# Patient Record
Sex: FEMALE | Race: WHITE | NOT HISPANIC OR LATINO | Employment: OTHER | URBAN - METROPOLITAN AREA
[De-identification: names, ages, dates, MRNs, and addresses within clinical notes are randomized per-mention and may not be internally consistent; named-entity substitution may affect disease eponyms.]

---

## 2017-05-04 ENCOUNTER — APPOINTMENT (OUTPATIENT)
Dept: WOMENS IMAGING | Facility: HOSPITAL | Age: 62
End: 2017-05-04

## 2017-05-04 PROCEDURE — 77067 SCR MAMMO BI INCL CAD: CPT | Performed by: RADIOLOGY

## 2017-06-16 ENCOUNTER — OFFICE VISIT (OUTPATIENT)
Dept: CARDIOLOGY | Facility: CLINIC | Age: 62
End: 2017-06-16

## 2017-06-16 VITALS
DIASTOLIC BLOOD PRESSURE: 62 MMHG | SYSTOLIC BLOOD PRESSURE: 90 MMHG | BODY MASS INDEX: 18.49 KG/M2 | WEIGHT: 122 LBS | HEART RATE: 65 BPM | HEIGHT: 68 IN

## 2017-06-16 DIAGNOSIS — I47.1 SVT (SUPRAVENTRICULAR TACHYCARDIA) (HCC): ICD-10-CM

## 2017-06-16 DIAGNOSIS — R55 NEAR SYNCOPE: Primary | ICD-10-CM

## 2017-06-16 DIAGNOSIS — I34.1 MVP (MITRAL VALVE PROLAPSE): ICD-10-CM

## 2017-06-16 DIAGNOSIS — R07.2 PRECORDIAL PAIN: ICD-10-CM

## 2017-06-16 PROCEDURE — 93000 ELECTROCARDIOGRAM COMPLETE: CPT | Performed by: INTERNAL MEDICINE

## 2017-06-16 PROCEDURE — 99213 OFFICE O/P EST LOW 20 MIN: CPT | Performed by: INTERNAL MEDICINE

## 2017-06-16 RX ORDER — GABAPENTIN 300 MG/1
300 CAPSULE ORAL 2 TIMES DAILY
COMMUNITY
End: 2017-12-18 | Stop reason: ALTCHOICE

## 2017-06-16 RX ORDER — TEMAZEPAM 30 MG/1
30 CAPSULE ORAL NIGHTLY PRN
COMMUNITY

## 2017-06-16 NOTE — PROGRESS NOTES
Date of Office Visit: 2017  Encounter Provider: Petra Tinoco MD  Place of Service: [facility] CARDIOLOGY  Patient Name: Ed Betancur  :1955    Chief complaint  Followup of mitral valve prolapse and supraventricular tachycardia    History of Present Illness  The patient is a 62 year old female with history of intermittent chest pain that's been predominantly atypical and nonspecific ST-T wave changes were seen in 2016 after an episode of near syncope in the setting of GI distress and a history of 30 pound weight loss.  She had a 17 day monitor that revealed sinus rhythm with no arrhythmia despite symptoms of dizziness.  She had a stress echocardiogram that revealed no ischemia at an excellent workload with 2 episodes of brief supraventricular tachycardia noted in early recovery resting images showed normal systolic function with negative saline injection.  There was mitral valve prolapse with mild to moderate mitral regurgitation noted.  There was mild to moderate tricuspid regurgitation with normal right-sided pressures.  These are protective maneuvers were discussed as well as liberalization of salt and she is here for follow-up    In the interim she states that her dizzy spells have improved she is walking 5-6 times a day.  Blood pressure remains low ago she's tried to liberalize salt slightly.  She has sharp stabbing fleeting chest pain that occurs every 2-3 weeks.  It is decreased in frequency.  Of note she denies any apneic episodes snoring family history of sleep apnea or daytime somnolence.    Past Medical History:   Diagnosis Date   • Acute bronchitis    • Chest pain    • Epigastric pain    • Fatigue    • Headache    • Health care maintenance    • Insomnia    • Muscle spasm    • Pain, upper back    • Postmenopausal      Past Surgical History:   Procedure Laterality Date   • BREAST SURGERY     •  SECTION     • HX OVARIAN CYSTECTOMY     • HYSTEROSCOPY  ENDOMETRIAL ABLATION     • OVARIAN CYST REMOVAL     • TONSILLECTOMY       Outpatient Medications Prior to Visit   Medication Sig Dispense Refill   • acetaminophen-codeine (TYLENOL #3) 300-30 MG per tablet Take by mouth.     • atorvastatin (LIPITOR) 10 MG tablet Take 10 mg by mouth Daily.     • DULoxetine (CYMBALTA) 30 MG capsule Take 30 mg by mouth Daily.     • estradiol (ESTRACE) 0.1 MG/GM vaginal cream Insert 2 g into the vagina Daily.     • Magnesium Oxide (MAG-200 PO) Take 300 mg by mouth Daily.     • Multiple Vitamins-Minerals (MULTIVITAMIN ADULT PO) Take  by mouth.     • Nutritional Supplements (JUICE PLUS FIBRE PO) Take  by mouth.     • Probiotic Product (PROBIOTIC DAILY PO) Take  by mouth.     • eszopiclone (LUNESTA) 3 MG tablet Take 3 mg by mouth Every Night. Take immediately before bedtime     • LORazepam (ATIVAN) 1 MG tablet Take 1 mg by mouth Every 8 (Eight) Hours As Needed for anxiety.     • OMEGA FATTY ACIDS-VITAMINS PO Take  by mouth.     • sucralfate (CARAFATE) 1 G tablet Take 1 g by mouth 2 (Two) Times a Day.     • zolpidem (AMBIEN) 10 MG tablet Take 10 mg by mouth At Night As Needed for sleep. At bedtime       No facility-administered medications prior to visit.      Allergies as of 06/16/2017 - [facility] as Reviewed 06/16/2017   Allergen Reaction Noted   • Sulfa antibiotics Hives 06/25/2012   • Latex Rash 11/10/2016     Social History     Social History   • Marital status:      Spouse name: N/A   • Number of children: N/A   • Years of education: N/A     Occupational History   • Not on file.     Social History Main Topics   • Smoking status: Former Smoker   • Smokeless tobacco: Not on file   • Alcohol use Yes      Comment: moderate alcohol use   • Drug use: Not on file   • Sexual activity: Not on file     Other Topics Concern   • Not on file     Social History Narrative     Family History   Problem Relation Age of Onset   • Alzheimer's disease Mother    • Breast cancer Mother    • Heart disease  "Father    • Colon cancer Maternal Grandmother    • Breast cancer Paternal Grandmother    • Alzheimer's disease Paternal Grandfather      Review of Systems   Constitution: Negative for fever, malaise/fatigue, weight gain and weight loss.   HENT: Negative for ear pain, hearing loss, nosebleeds and sore throat.    Eyes: Negative for double vision, pain, vision loss in left eye and vision loss in right eye.   Cardiovascular:        See history of present illness.   Respiratory: Positive for shortness of breath. Negative for cough, sleep disturbances due to breathing, snoring and wheezing.    Endocrine: Negative for cold intolerance, heat intolerance and polyuria.   Skin: Negative for itching, poor wound healing and rash.   Musculoskeletal: Positive for myalgias. Negative for joint pain and joint swelling.   Gastrointestinal: Negative for abdominal pain, diarrhea, hematochezia, nausea and vomiting.   Genitourinary: Negative for hematuria and hesitancy.   Neurological: Positive for paresthesias. Negative for numbness and seizures.   Psychiatric/Behavioral: Negative for depression. The patient is not nervous/anxious.      Objective:     Vitals:    06/16/17 1009   BP: 90/62   Pulse: 65   Weight: 122 lb (55.3 kg)   Height: 68\" (172.7 cm)     Body mass index is 18.55 kg/(m^2).    Physical Exam   Constitutional: She is oriented to person, place, and time. She appears well-developed and well-nourished.   HENT:   Head: Normocephalic.   Nose: Nose normal.   Mouth/Throat: Oropharynx is clear and moist.   Eyes: Conjunctivae and EOM are normal. Pupils are equal, round, and reactive to light. Right eye exhibits no discharge. No scleral icterus.   Neck: Normal range of motion. Neck supple. No JVD present. No thyromegaly present.   Cardiovascular: Normal rate, regular rhythm, normal heart sounds and intact distal pulses.  Exam reveals no gallop and no friction rub.    No murmur heard.  Pulses:       Carotid pulses are 2+ on the right " side, and 2+ on the left side.       Radial pulses are 2+ on the right side, and 2+ on the left side.        Femoral pulses are 2+ on the right side, and 2+ on the left side.       Popliteal pulses are 2+ on the right side, and 2+ on the left side.        Dorsalis pedis pulses are 2+ on the right side, and 2+ on the left side.        Posterior tibial pulses are 2+ on the right side, and 2+ on the left side.   Pulmonary/Chest: Effort normal and breath sounds normal. No respiratory distress. She has no wheezes. She has no rales.   Abdominal: Soft. Bowel sounds are normal. She exhibits no distension. There is no hepatosplenomegaly. There is no tenderness. There is no rebound.   Musculoskeletal: Normal range of motion. She exhibits no edema or tenderness.   Neurological: She is alert and oriented to person, place, and time.   Skin: Skin is warm and dry. No rash noted. No erythema.   Psychiatric: She has a normal mood and affect. Her behavior is normal. Judgment and thought content normal.   Vitals reviewed.    Lab Review:     ECG 12 Lead  Date/Time: 6/16/2017 10:05 PM  Performed by: YULIA STOVER  Authorized by: YULIA STOVER   Comparison: compared with previous ECG   Similar to previous ECG  Rhythm: sinus rhythm  Conduction comments: Nonspecific ST T wave changes  QTc = 439 msec  Clinical impression: abnormal ECG          Assessment:       Diagnosis Plan   1. Near syncope  ECG 12 Lead   2. Precordial pain  ECG 12 Lead   3. SVT (supraventricular tachycardia)  ECG 12 Lead   4. MVP (mitral valve prolapse)  ECG 12 Lead     Plan:       1. Near syncope, Likely vaguely mediated.  I strongly recommended she wear some support stockings and continue to liberalize salt watching her blood pressure carefully for hypertension  2. Paroxysmal supraventricular tachycardia, episodes of quite brief we'll observe for now she is avoiding stimulants  3. MVP with mild to moderate MR. Will check an echo in 6 moths at followup     Ed Betancur    Home Medication Instructions RENEE:    Printed on:06/19/17 2184   Medication Information                      acetaminophen-codeine (TYLENOL #3) 300-30 MG per tablet  Take by mouth.             atorvastatin (LIPITOR) 10 MG tablet  Take 10 mg by mouth Daily.             DULoxetine (CYMBALTA) 30 MG capsule  Take 30 mg by mouth Daily.             estradiol (ESTRACE) 0.1 MG/GM vaginal cream  Insert 2 g into the vagina Daily.             gabapentin (NEURONTIN) 300 MG capsule  Take 300 mg by mouth 2 (Two) Times a Day.             Magnesium Oxide (MAG-200 PO)  Take 300 mg by mouth Daily.             Multiple Vitamins-Minerals (MULTIVITAMIN ADULT PO)  Take  by mouth.             Nutritional Supplements (JUICE PLUS FIBRE PO)  Take  by mouth.             Probiotic Product (PROBIOTIC DAILY PO)  Take  by mouth.             temazepam (RESTORIL) 30 MG capsule  Take 30 mg by mouth At Night As Needed for Sleep.               Dictated utilizing Dragon dictation

## 2017-06-18 PROBLEM — I34.1 MVP (MITRAL VALVE PROLAPSE): Status: ACTIVE | Noted: 2017-06-18

## 2017-06-18 PROBLEM — I47.1 SVT (SUPRAVENTRICULAR TACHYCARDIA) (HCC): Status: ACTIVE | Noted: 2017-06-18

## 2017-06-18 PROBLEM — I47.10 SVT (SUPRAVENTRICULAR TACHYCARDIA): Status: ACTIVE | Noted: 2017-06-18

## 2017-12-18 ENCOUNTER — OFFICE VISIT (OUTPATIENT)
Dept: CARDIOLOGY | Facility: CLINIC | Age: 62
End: 2017-12-18

## 2017-12-18 VITALS
HEIGHT: 68 IN | SYSTOLIC BLOOD PRESSURE: 98 MMHG | WEIGHT: 128.6 LBS | DIASTOLIC BLOOD PRESSURE: 62 MMHG | HEART RATE: 68 BPM | BODY MASS INDEX: 19.49 KG/M2

## 2017-12-18 DIAGNOSIS — I34.0 NON-RHEUMATIC MITRAL REGURGITATION: Primary | ICD-10-CM

## 2017-12-18 DIAGNOSIS — I47.1 SVT (SUPRAVENTRICULAR TACHYCARDIA) (HCC): ICD-10-CM

## 2017-12-18 DIAGNOSIS — I34.1 MVP (MITRAL VALVE PROLAPSE): ICD-10-CM

## 2017-12-18 PROCEDURE — 93000 ELECTROCARDIOGRAM COMPLETE: CPT | Performed by: INTERNAL MEDICINE

## 2017-12-18 PROCEDURE — 99213 OFFICE O/P EST LOW 20 MIN: CPT | Performed by: INTERNAL MEDICINE

## 2017-12-18 RX ORDER — DULOXETIN HYDROCHLORIDE 60 MG/1
60 CAPSULE, DELAYED RELEASE ORAL DAILY
COMMUNITY

## 2017-12-18 NOTE — PROGRESS NOTES
Date of Office Visit: 2017  Encounter Provider: Petra Tinoco MD  Place of Service: [facility] CARDIOLOGY  Patient Name: Ed Betancur  :1955    Chief complaint  Followup of mitral valve prolapse and supraventricular tachycardia    History of Present Illness  The patient is a 62 year old female with history of intermittent chest pain that's been predominantly atypical and nonspecific ST-T wave changes were seen in 2016 after an episode of near syncope in the setting of GI distress and a history of 30 pound weight loss.  She had a 17 day monitor that revealed sinus rhythm with no arrhythmia despite symptoms of dizziness.  She had a stress echocardiogram that revealed no ischemia at an excellent workload with 2 episodes of brief supraventricular tachycardia noted in early recovery resting images showed normal systolic function with negative saline injection.  There was mitral valve prolapse with mild to moderate mitral regurgitation noted.  There was mild to moderate tricuspid regurgitation with normal right-sided pressures.  Vasoprotective maneuvers were discussed as well as liberalization of salt and she is here for follow-up.    Since last visit she had been doing well though approximately 2 weeks ago she had 5 days of hypertension associated with fullness behind her eyes. Her blood pressure was in the 140s over 90s. She admits to having had increased stresses at home and may have had some dietary indiscretions with salt. This slowly resolved. She states that typically has palpitations once every 3 weeks that last for a few seconds. They start and stop suddenly and lasts for about 10 seconds and resolves. She has dyspnea on exertion that occurs when she is vacuuming or walking up a hill though is able to walk on a treadmill for 1.5 miles in 30 minutes 5 days a week without difficulty. She has mild dependent edema. She has had no chest pain after starting Cymbalta 6  weeks for esophageal spasm. She consumes mild amounts of caffeinated beverages but denies any use of alcohol or over-the-counter cold sinus meds.      Past Medical History:   Diagnosis Date   • Acute bronchitis    • Chest pain    • Epigastric pain    • Fatigue    • Headache    • Health care maintenance    • Insomnia    • Muscle spasm    • Pain, upper back    • Postmenopausal    • Spasm of esophagus      Past Surgical History:   Procedure Laterality Date   • BREAST SURGERY     •  SECTION     • HX OVARIAN CYSTECTOMY     • HYSTEROSCOPY ENDOMETRIAL ABLATION     • OVARIAN CYST REMOVAL     • TONSILLECTOMY       Outpatient Medications Prior to Visit   Medication Sig Dispense Refill   • acetaminophen-codeine (TYLENOL #3) 300-30 MG per tablet Take by mouth.     • atorvastatin (LIPITOR) 10 MG tablet Take 10 mg by mouth Daily.     • estradiol (ESTRACE) 0.1 MG/GM vaginal cream Insert 2 g into the vagina Daily.     • Magnesium Oxide (MAG-200 PO) Take 300 mg by mouth Daily.     • Multiple Vitamins-Minerals (MULTIVITAMIN ADULT PO) Take  by mouth.     • Nutritional Supplements (JUICE PLUS FIBRE PO) Take  by mouth.     • Probiotic Product (PROBIOTIC DAILY PO) Take  by mouth.     • temazepam (RESTORIL) 30 MG capsule Take 30 mg by mouth At Night As Needed for Sleep.     • DULoxetine (CYMBALTA) 30 MG capsule Take 30 mg by mouth Daily.     • gabapentin (NEURONTIN) 300 MG capsule Take 300 mg by mouth 2 (Two) Times a Day.       No facility-administered medications prior to visit.      Allergies as of 2017 - [facility] as Reviewed 2017   Allergen Reaction Noted   • Sulfa antibiotics Hives 2012   • Latex Rash 11/10/2016     Social History     Social History   • Marital status:      Spouse name: N/A   • Number of children: N/A   • Years of education: N/A     Occupational History   • Not on file.     Social History Main Topics   • Smoking status: Former Smoker   • Smokeless tobacco: Not on file   • Alcohol use Yes  "     Comment: moderate alcohol use   • Drug use: Not on file   • Sexual activity: Not on file     Other Topics Concern   • Not on file     Social History Narrative     Family History   Problem Relation Age of Onset   • Alzheimer's disease Mother    • Breast cancer Mother    • Heart disease Father    • Colon cancer Maternal Grandmother    • Breast cancer Paternal Grandmother    • Alzheimer's disease Paternal Grandfather      Review of Systems   Constitution: Positive for malaise/fatigue. Negative for fever, weight gain and weight loss.   HENT: Negative for ear pain, hearing loss, nosebleeds and sore throat.    Eyes: Negative for double vision, pain, vision loss in left eye and vision loss in right eye.   Cardiovascular: Positive for chest pain and dyspnea on exertion.        See history of present illness.   Respiratory: Negative for cough, shortness of breath, sleep disturbances due to breathing, snoring and wheezing.    Endocrine: Negative for cold intolerance, heat intolerance and polyuria.   Skin: Negative for itching, poor wound healing and rash.   Musculoskeletal: Negative for joint pain, joint swelling and myalgias.   Gastrointestinal: Negative for abdominal pain, diarrhea, hematochezia, nausea and vomiting.   Genitourinary: Negative for hematuria and hesitancy.   Neurological: Positive for dizziness and paresthesias. Negative for numbness and seizures.   Psychiatric/Behavioral: Negative for depression. The patient is not nervous/anxious.      Objective:     Vitals:    12/18/17 0950   BP: 98/62   BP Location: Left arm   Pulse: 68   Weight: 58.3 kg (128 lb 9.6 oz)   Height: 172.7 cm (68\")     Body mass index is 19.55 kg/(m^2).    Physical Exam   Constitutional: She is oriented to person, place, and time. She appears well-developed and well-nourished.   HENT:   Head: Normocephalic.   Nose: Nose normal.   Mouth/Throat: Oropharynx is clear and moist.   Eyes: Conjunctivae and EOM are normal. Pupils are equal, " round, and reactive to light. Right eye exhibits no discharge. No scleral icterus.   Neck: Normal range of motion. Neck supple. No JVD present. No thyromegaly present.   Cardiovascular: Normal rate, regular rhythm and intact distal pulses.  Exam reveals no gallop and no friction rub.    Murmur heard.   Systolic murmur is present with a grade of 1/6  radiating to the apex  Pulses:       Carotid pulses are 2+ on the right side, and 2+ on the left side.       Radial pulses are 2+ on the right side, and 2+ on the left side.        Femoral pulses are 2+ on the right side, and 2+ on the left side.       Popliteal pulses are 2+ on the right side, and 2+ on the left side.        Dorsalis pedis pulses are 2+ on the right side, and 2+ on the left side.        Posterior tibial pulses are 2+ on the right side, and 2+ on the left side.   Pulmonary/Chest: Effort normal and breath sounds normal. No respiratory distress. She has no wheezes. She has no rales.   Abdominal: Soft. Bowel sounds are normal. She exhibits no distension. There is no hepatosplenomegaly. There is no tenderness. There is no rebound.   Musculoskeletal: Normal range of motion. She exhibits no edema or tenderness.   Neurological: She is alert and oriented to person, place, and time.   Skin: Skin is warm and dry. No rash noted. No erythema.   Psychiatric: She has a normal mood and affect. Her behavior is normal. Judgment and thought content normal.   Vitals reviewed.    Lab Review:     ECG 12 Lead  Date/Time: 12/18/2017 10:04 AM  Performed by: YULIA STOVER  Authorized by: YULIA STOVER   Comparison: compared with previous ECG   Similar to previous ECG  Rhythm: sinus rhythm  Clinical impression: normal ECG          Assessment:       Diagnosis Plan   1. Non-rheumatic mitral regurgitation  Adult Transthoracic Echo Complete W/ Cont if Necessary Per Protocol   2. SVT (supraventricular tachycardia)     3. MVP (mitral valve prolapse)  Adult Transthoracic Echo Complete W/  Cont if Necessary Per Protocol     Plan:       IMPRESSION AND PLAN:  1. Isolated hypertension likely due to stress and dietary indiscretions with salt. She will just follow her blood pressure more closely.  2. Near syncope likely vaguely mediated though no recurrence.  3. Paroxysmal supraventricular tachycardia with minimal residual symptoms. She will try to decrease caffeine use. Continue her current regimen.  4. Mild mitral prolapse with mild to moderate mitral regurgitation. Will check a followup echocardiogram.           Ed Betancur   Home Medication Instructions RENEE:    Printed on:12/18/17 1016   Medication Information                      acetaminophen-codeine (TYLENOL #3) 300-30 MG per tablet  Take by mouth.             atorvastatin (LIPITOR) 10 MG tablet  Take 10 mg by mouth Daily.             DULoxetine (CYMBALTA) 60 MG capsule  Take 60 mg by mouth Daily.             estradiol (ESTRACE) 0.1 MG/GM vaginal cream  Insert 2 g into the vagina Daily.             Magnesium Oxide (MAG-200 PO)  Take 300 mg by mouth Daily.             Multiple Vitamins-Minerals (MULTIVITAMIN ADULT PO)  Take  by mouth.             Nutritional Supplements (JUICE PLUS FIBRE PO)  Take  by mouth.             Probiotic Product (PROBIOTIC DAILY PO)  Take  by mouth.             temazepam (RESTORIL) 30 MG capsule  Take 30 mg by mouth At Night As Needed for Sleep.                 Dictated utilizing Dragon dictation

## 2017-12-27 ENCOUNTER — TRANSCRIBE ORDERS (OUTPATIENT)
Dept: PHYSICAL THERAPY | Facility: HOSPITAL | Age: 62
End: 2017-12-27

## 2017-12-27 DIAGNOSIS — M54.2 NECK PAIN: Primary | ICD-10-CM

## 2017-12-28 ENCOUNTER — HOSPITAL ENCOUNTER (OUTPATIENT)
Dept: PHYSICAL THERAPY | Facility: HOSPITAL | Age: 62
Setting detail: THERAPIES SERIES
Discharge: HOME | End: 2017-12-28

## 2017-12-28 DIAGNOSIS — M54.2 NECK PAIN: Primary | ICD-10-CM

## 2017-12-28 PROCEDURE — 97161 PT EVAL LOW COMPLEX 20 MIN: CPT | Performed by: PHYSICAL THERAPIST

## 2017-12-28 NOTE — THERAPY EVALUATION
"    Outpatient Physical Therapy Ortho Initial Evaluation  [facility]     Patient Name: Ed Betancur  : 1955  MRN: [id number]  Today's Date: 2017      Visit Date: 2017    Patient Active Problem List   Diagnosis   • Precordial pain   • Near syncope   • SVT (supraventricular tachycardia)   • MVP (mitral valve prolapse)        Past Medical History:   Diagnosis Date   • Acute bronchitis    • Chest pain    • Epigastric pain    • Fatigue    • Headache    • Health care maintenance    • Insomnia    • Muscle spasm    • Pain, upper back    • Postmenopausal    • Spasm of esophagus         Past Surgical History:   Procedure Laterality Date   • BREAST SURGERY     •  SECTION     • HX OVARIAN CYSTECTOMY     • HYSTEROSCOPY ENDOMETRIAL ABLATION     • OVARIAN CYST REMOVAL     • TONSILLECTOMY         Visit Dx:     ICD-10-CM ICD-9-CM   1. Neck pain M54.2 723.1             Patient History       17 0900          History    Chief Complaint Burn;Pain  -RA      Type of Pain Neck pain  -RA      Date Current Problem(s) Began --   about 3 weeks ago  -RA      Brief Description of Current Complaint Onset of neck/R shoulder pain about 3 weeks ago that started after she washed her hair, wrapped it in towel, and then pressed down on top of head/towel - felt a \"pop\"/pain and had decreased ability to move head 2/2 pain.    -RA      Previous treatment for THIS PROBLEM Medication  -RA      Onset Date- PT 17  -RA      Patient/Caregiver Goals Relieve pain;Return to prior level of function;Improve mobility;Know what to do to help the symptoms  -RA      Occupation/sports/leisure activities [employer] employee  -RA      Patient seeing anyone else for problem(s)? Yes  -RA      How has patient tried to help current problem? medication  -RA      Pain     Pain Location Neck  -RA      Pain at Present 4  -RA      Pain at Best 4  -RA      Pain at Worst 7;8  -RA      Pain Frequency Constant/continuous;Intermittent  -RA      Pain " Description Burning;Sharp;Stabbing  -RA      What Performance Factors Make the Current Problem(s) WORSE? moving/turning head, driving, stress, lifting, exercising, sleeping   -RA      What Performance Factors Make the Current Problem(s) BETTER? rest, limiting movement, activity avoidance  -RA      Is your sleep disturbed? Yes   better since started on muscle relaxers  -RA      Is medication used to assist with sleep? --   muscle relaxers, ASA  -RA      What position do you sleep in? Supine;Right sidelying;Left sidelying   currently supine, L SL, prefers R SL but not able  -RA      Difficulties with ADL's? washing/drying/fixing hair  -RA      Difficulties with recreational activities? exercise  -RA      Fall Risk Assessment    Any falls in the past year: Yes  -RA      Number of falls reported in the last 12 months 2  -RA      Factors that contributed to the fall: --   vasovagal syncope  -RA      Daily Activities    Primary Language English  -RA      Are you able to read Yes  -RA      Are you able to write Yes  -RA      How does patient learn best? Pictures/Video;Demonstration;Listening  -RA      Teaching needs identified Home Exercise Program;Management of Condition  -RA      Patient is concerned about/has problems with Reaching over head;Repetitive movements of the hand, arm, shoulder;Performing sports, recreation, and play activities;Performing home management (household chores, shopping, care of dependents);Grasping objects lifting;Difficulty with self care (i.e. bathing, dressing, toileting:  -RA      Does patient have problems with the following? None  -RA      Barriers to learning None  -RA      Explanation of Functional Status Problem independent with pain   -RA      Pt Participated in POC and Goals Yes  -RA      Safety    Are you being hurt, hit, or frightened by anyone at home or in your life? No  -RA      Are you being neglected by a caregiver No  -RA        User Key  (r) = Recorded By, (t) = Taken By, (c)  "= Cosigned By    Initials Name Provider Type    DIANA Montemayor PT Physical Therapist                PT Ortho       12/28/17 0900    Posture/Observations    Posture/Observations Comments stiff/guarded posturing, mild FH/rounded shoulders  -RA    Special Tests/Palpation    Special Tests/Palpation Cervical/Thoracic  -RA    Cervical Palpation    Cervical Palpation- Location? --   tender L mid cerv, B UT, R distal levator  -RA    Cervical Accessory Motions    Cervical Accessory Motions Tested? Yes   decreased, guarding 2/2 pain  -RA    Neck    Flexion ROM Details slow mvmt, able to get 75%, pain/burning   -RA    Extension ROM Details slow mvmt, able to get 50% extension - pain, \"pinches\"  -RA    Lt Lat Flexion ROM Details slow mvmt, 10% pain  -RA    Rt Lateral Flexion ROM Details slow mvmt, 10-15% pain  -RA    Lt Rotation ROM Details slow mvmt, able to get 60%, pulls at 50%, pain at 60% - passive pain at end range 60-65%  -RA    Rt Rotation ROM Details slow mvmt, able to get 75%, \"pinches\" - passive 80% without pain  -RA    MMT (Manual Muscle Testing)    General MMT Assessment neck/trunk strength deficits identified  -RA    Neck Trunk    Neck/Trunk Manual Muscle Testing Detail pain with cervical isometrics - grossly 4-/5 to 4/5   -RA    Upper Extremity Flexibility    UE Flexibility Comments tightness of B UT, levator, pec tissues   -RA      User Key  (r) = Recorded By, (t) = Taken By, (c) = Cosigned By    Initials Name Provider Type    DIANA Montemayor PT Physical Therapist                      Therapy Education  Education Details: Discussed anatomy/mechanics of cervical spine, optimal posture, spinal decompression, relaxation/stress management strategies, gentle movement, and therapy POC.  Given: HEP, Symptoms/condition management, Pain management, Posture/body mechanics  Program: New  How Provided: Verbal, Demonstration, Other (comment) (offered printed copy, patient declined)  Provided to: Patient  Level of " Understanding: Teach back education performed, Verbalized           PT OP Goals       12/28/17 0900       PT Short Term Goals    STG 1 Patient will be independent with initial HEP for posture and ROM/flexibility without exacerbation of symptoms.   -RA     STG 2 Patient will be educated on and demonstrate knowledge of optimal posture, decompression strategies, ergonomics, good body mechanics, and proper lifting techniques to minimize stresses to spine and soft tissues with daily activities  -RA     STG 3 Patient will have 75% cervical rotation with minimal to no pain for increased ease with driving  -RA     Long Term Goals    LTG 1 Patient will be independent with established HEP for strength/stabilization to facilitate return to desired function  -RA     LTG 2 Patient will have improved postural/cervical strength for greater ease with activities such as washing/drying hair  -RA     LTG 3 Patient will report peak pain in neck </= 2/10 with performance of daily activities.  -RA     LTG 4 Patient will report reduced functional limitations on NDI from 24% to </= 14%  -RA     LTG 5 Patient will be educated on and verbalize/demonstrate knowledge of safe/appropriate gym ex for reduced risk of symptom exacerbation with return to regular exercise routine  -RA     Time Calculation    PT Goal Re-Cert Due Date 01/27/18  -RA       User Key  (r) = Recorded By, (t) = Taken By, (c) = Cosigned By    Initials Name Provider Type    RA Macie Montemayor, PT Physical Therapist                PT Assessment/Plan       12/28/17 1305       PT Assessment    Functional Limitations Limitation in home management;Limitations in community activities;Performance in leisure activities;Performance in self-care ADL;Performance in work activities  -RA     Impairments Posture;Range of motion;Muscle strength;Endurance  -RA     Assessment Comments Patient is a 63yo F with onset of neck/R shoulder pain about 3 weeks ago.  She states after washing hair and  "wrapping it in a towel she pressed down and felt \"pop\" pain/burning.  She describes constant burning pain since with more severe pain with movements but does feel her pain has improved some after MD started her on muscle relaxers.  She has hx of vasovagal syncope x 2 in past year, B foot/leg \"pins and needles\" (parasthesia - unknown cause), and R UE burning pain (x 2-3 years).  She presents to therapy with guarded/stiff posturing of cervical spine, limited/painful cervical ROM, functional B UE ROM with discomfort on functional ER, and \"pinching\" on end range flex/abd, pain with cervical isometric testing, pain on R shoulder MMT flexion > abduction, decreased flexibility of B UT/levator/pecs,, moderate/severe tenderness with palpation of mid cervical spine L>R, R levator, and mild/moderate B UT tissues.  She indicates pain eases some with gentle cervical distraction.  Her self reported NDI score = 11/50 or 22% disability.  She is cautious with all mobility secondary to pain and reports aggravating factors/activities to be stress, washing/drying hair, moving head, driving (has to turn whole body), UE OH activities, lifting/carrying objects, exercising, and sleeping/getting comfortable to sleep at night.  She will benefit from skilled therapy for education, ROM, flexibility, strength/stabilization, and manual/modalities prn to reduce pain and facilitate return to desired activity level.  -RA     Please refer to paper survey for additional self-reported information Yes   NDI score = 12/50 or 24% disability  -RA     Rehab Potential Good  -RA     Patient/caregiver participated in establishment of treatment plan and goals Yes  -RA     Patient would benefit from skilled therapy intervention Yes  -RA     PT Plan    PT Frequency 1x/week;2x/week  -RA     Predicted Duration of Therapy Intervention (days/wks) 3-4 weeks   -RA     Planned CPT's? PT EVAL LOW COMPLEXITY: 72849;PT THER PROC EA 15 MIN: 43810;PT HOT OR COLD PACK TREAT " MCARE;PT MANUAL THERAPY EA 15 MIN: 78083;PT THER ACT EA 15 MIN: 27000;PT SELF CARE/HOME MGMT/TRAIN EA 15: 61443;PT ULTRASOUND EA 15 MIN: 17781;PT ELECTRICAL STIM UNATTEND: ;PT TRACTION CERVICAL: 85450;PT NEUROMUSC RE-EDUCATION EA 15 MIN: 19020  -RA     PT Plan Comments Skilled PT for cervical/shoulder pain including ther ex for ROM/flexibility, strengthening, and manual/modalities prn to reduce pain, improve function, and facilitate self managemen of symptoms with return to prior function  -RA       User Key  (r) = Recorded By, (t) = Taken By, (c) = Cosigned By    Initials Name Provider Type    DIANA Montemayor, DELORES Physical Therapist                  Exercises       12/28/17 0900          Exercise 1    Exercise Name 1 Patient instructed in chin tucks and gentle cervical rotation supine, gentle UT stretching, pec stretching (doorway), reverse shoulder rolls, and scap retraction  -RA        User Key  (r) = Recorded By, (t) = Taken By, (c) = Cosigned By    Initials Name Provider Type    DIANA Montemayor, DELORES Physical Therapist           Manual Rx (last 36 hours)      Manual Treatments       12/28/17 0900          Manual Rx 1    Manual Rx 1 Location cervical spine/shoulder girdle   -RA      Manual Rx 1 Type Gentle PROM to patient tolerance, light STM (to patient tolerance), gentle cervical distraction, attempted UT stretching - okay on L, + pain on R so discontinued  -RA        User Key  (r) = Recorded By, (t) = Taken By, (c) = Cosigned By    Initials Name Provider Type    DIANA Montemayor, DELORES Physical Therapist                      Outcome Measure Options: Neck Disability Index (NDI)  Neck Disability Index  Section 1 - Pain Intensity: The pain is moderate at the moment.  Section 2 - Personal Care: I can look after myself normally, but it causes extra pain.  Section 3 - Lifting: Pain prevents me from lifting heavy weights off the floor but I can manage if items are conveniently positioned, ie. on a  table.  Section 4 - Work: I can do as much work as I want.  Section 5 - Headaches: I have moderate headaches that come infrequently.  Section 6 - Concentration: I can concentrate fully without difficulty.  Section 7 - Sleeping: My sleep is slightly disturbed for less than 1 hour.  Section 8 - Driving: I can drive as long as I want with moderate neck pain.  Section 9 - Reading: I can read as much as I want with no neck pain.  Section 10 - Recreation: I have some neck pain with a few recreational activities.  Neck Disability Index Score: 12  Neck Disability Index Comments: 24% disability      Time Calculation:   Start Time: 0845  Stop Time: 0928  Time Calculation (min): 43 min     Therapy Charges for Today     Code Description Service Date Service Provider Modifiers Qty    35158682771 HC PT EVAL LOW COMPLEXITY 3 12/28/2017 Macie Montemayor, PT GP 1          PT G-Codes  Outcome Measure Options: Neck Disability Index (NDI)         Macie Montemayor, PT  12/28/2017

## 2018-01-02 ENCOUNTER — HOSPITAL ENCOUNTER (OUTPATIENT)
Dept: PHYSICAL THERAPY | Facility: HOSPITAL | Age: 63
Setting detail: THERAPIES SERIES
Discharge: HOME | End: 2018-01-02

## 2018-01-02 DIAGNOSIS — M54.2 NECK PAIN: Primary | ICD-10-CM

## 2018-01-02 PROCEDURE — 97140 MANUAL THERAPY 1/> REGIONS: CPT | Performed by: PHYSICAL THERAPIST

## 2018-01-02 NOTE — THERAPY TREATMENT NOTE
Outpatient Physical Therapy Ortho Treatment Note  [facility]     Patient Name: Ed Betancur  : 1955  MRN: [id number]  Today's Date: 2018      Visit Date: 2018    Visit Dx:    ICD-10-CM ICD-9-CM   1. Neck pain M54.2 723.1       Patient Active Problem List   Diagnosis   • Precordial pain   • Near syncope   • SVT (supraventricular tachycardia)   • MVP (mitral valve prolapse)        Past Medical History:   Diagnosis Date   • Acute bronchitis    • Chest pain    • Epigastric pain    • Fatigue    • Headache    • Health care maintenance    • Insomnia    • Muscle spasm    • Pain, upper back    • Postmenopausal    • Spasm of esophagus         Past Surgical History:   Procedure Laterality Date   • BREAST SURGERY     •  SECTION     • HX OVARIAN CYSTECTOMY     • HYSTEROSCOPY ENDOMETRIAL ABLATION     • OVARIAN CYST REMOVAL     • TONSILLECTOMY                               PT Assessment/Plan       18 1357       PT Assessment    Assessment Comments Lower cervical/upper thoracic hypomobility and inflammation limiting to L more than R rotation and B side bending and lesser flexion and extension with pain/some apprehension and joint stiffness.  Mobility was better with active rotation and passive rotation and SB at end of treatment.   -PB       User Key  (r) = Recorded By, (t) = Taken By, (c) = Cosigned By    Initials Name Provider Type    CAYETANO Gutiérrez, PT Physical Therapist                Modalities       18 1300          Ice    Ice Applied Yes  -PB      Location R cervical /thoracic   -PB      Rx Minutes 10 mins  -PB      Ice S/P Rx Yes  -PB        User Key  (r) = Recorded By, (t) = Taken By, (c) = Cosigned By    Initials Name Provider Type    CAYETANO Gutiérrez, PT Physical Therapist                Exercises       18 1300          Subjective Comments    Subjective Comments I had to re-start the muscle relaxers after I felt like I went back to starting point.   -PB       "Subjective Pain    Able to rate subjective pain? yes  -PB      Pre-Treatment Pain Level 4  -PB      Post-Treatment Pain Level 4  -PB      Exercise 1    Exercise Name 1 Reinforced good posture and regular exercises staying out of sharp pain   -PB        User Key  (r) = Recorded By, (t) = Taken By, (c) = Cosigned By    Initials Name Provider Type    PB Carola Gutiérrez, PT Physical Therapist                        Manual Rx (last 36 hours)      Manual Treatments       01/02/18 1300          Manual Rx 1    Manual Rx 1 Location Cervical / thoracic spine   -PB      Manual Rx 1 Type PA to R C6/7-T2/3  -PB      Manual Rx 1 Grade III-IV  -PB      Manual Rx 2    Manual Rx 2 Location Cervical spine  -PB      Manual Rx 2 Type Downslides and upslides R C6/7, C7/T1  -PB      Manual Rx 3    Manual Rx 3 Location Cervical spine   -PB      Manual Rx 3 Type Manual distraction long axis   -PB      Manual Rx 3 Grade Sustained hold   -PB      Manual Rx 3 Duration 60\"/3  -PB      Manual Rx 4    Manual Rx 4 Location Cervical spine   -PB      Manual Rx 4 Type STM  -PB      Manual Rx 4 Grade R lower cervical PVM; LS; upper thoracic PVM  -PB      Manual Rx 5    Manual Rx 5 Location Cervical spine   -PB      Manual Rx 5 Type Manual PROM to facilitate end ROM of rotation and side bending with stretch   -PB      Manual Rx 5 Duration Total manual therapy time 30 min   -PB        User Key  (r) = Recorded By, (t) = Taken By, (c) = Cosigned By    Initials Name Provider Type    PB Carola Gutiérrez PT Physical Therapist                             Time Calculation:   Start Time: 1300  Stop Time: 1345  Time Calculation (min): 45 min    Therapy Charges for Today     Code Description Service Date Service Provider Modifiers Qty    08419117853  PT MANUAL THERAPY EA 15 MIN 1/2/2018 Carola Gutiérrez, PT GP 2                    Carola Gutiérrez, PT  1/2/2018     "

## 2018-01-04 ENCOUNTER — HOSPITAL ENCOUNTER (OUTPATIENT)
Dept: PHYSICAL THERAPY | Facility: HOSPITAL | Age: 63
Setting detail: THERAPIES SERIES
Discharge: HOME | End: 2018-01-04

## 2018-01-04 DIAGNOSIS — M54.2 NECK PAIN: Primary | ICD-10-CM

## 2018-01-04 PROCEDURE — 97140 MANUAL THERAPY 1/> REGIONS: CPT | Performed by: PHYSICAL THERAPIST

## 2018-01-04 NOTE — THERAPY TREATMENT NOTE
Outpatient Physical Therapy Ortho Treatment Note  [facility]     Patient Name: Ed Betancur  : 1955  MRN: [id number]  Today's Date: 2018      Visit Date: 2018    Visit Dx:    ICD-10-CM ICD-9-CM   1. Neck pain M54.2 723.1       Patient Active Problem List   Diagnosis   • Precordial pain   • Near syncope   • SVT (supraventricular tachycardia)   • MVP (mitral valve prolapse)        Past Medical History:   Diagnosis Date   • Acute bronchitis    • Chest pain    • Epigastric pain    • Fatigue    • Headache    • Health care maintenance    • Insomnia    • Muscle spasm    • Pain, upper back    • Postmenopausal    • Spasm of esophagus         Past Surgical History:   Procedure Laterality Date   • BREAST SURGERY     •  SECTION     • HX OVARIAN CYSTECTOMY     • HYSTEROSCOPY ENDOMETRIAL ABLATION     • OVARIAN CYST REMOVAL     • TONSILLECTOMY                               PT Assessment/Plan       18 1145       PT Assessment    Assessment Comments Able to gain measurable increase in B cervical rotation A/PROm with direct manual techniques to R lower cervical/upper thoracic spine biased with added cervical flexion to isolate stiff joints.Soft tissues are much more relaxed compared to last visit.  -PB     PT Plan    Planned CPT's? PT EVAL HIGH COMPLEXITY: 02745  -PB     PT Plan Comments Continue cervical joint mobilizations as tolerated to help restore ROM with less pain  -PB       User Key  (r) = Recorded By, (t) = Taken By, (c) = Cosigned By    Initials Name Provider Type    PB Carola Gutiérrez, PT Physical Therapist                    Exercises       18 1100          Subjective Comments    Subjective Comments Sore and stiff.  I am having a massage this afternoon.  -PB      Subjective Pain    Able to rate subjective pain? yes  -PB      Pre-Treatment Pain Level 4  -PB      Post-Treatment Pain Level 4  -PB      Exercise 1    Exercise Name 1 Patient instructed in gentle contract - relax with  cervical rotation to the left more than right side with head in some flexion to work on most limited direction.  Recommend about 5 reps at a time to avoid overdoing.  -PB        User Key  (r) = Recorded By, (t) = Taken By, (c) = Cosigned By    Initials Name Provider Type    PB Carola Gutiérrez PT Physical Therapist                        Manual Rx (last 36 hours)      Manual Treatments       01/04/18 1100          Manual Rx 1    Manual Rx 1 Location Cervical spine  -PB      Manual Rx 1 Type Upslides to R C6/7, C7/T1  -PB      Manual Rx 1 Grade Varied with added cervical flexion with elevating head support on table  -PB      Manual Rx 2    Manual Rx 2 Location Cervical spine  -PB      Manual Rx 2 Type Downslides to R C6/7, C7/T1  -PB      Manual Rx 3    Manual Rx 3 Location Cervical spine  -PB      Manual Rx 3 Type Manual long axis distraction  -PB      Manual Rx 3 Grade Moderate pull  -PB      Manual Rx 3 Duration 60”/3  -PB      Manual Rx 4    Manual Rx 4 Location Cervical spine  -PB      Manual Rx 4 Type Contract relax with L cervical rotation  -PB      Manual Rx 4 Grade Varied cervical flexion with table  -PB      Manual Rx 5    Manual Rx 5 Location Cervical / thoracic spine  -PB      Manual Rx 5 Type PA in prone to R C7/T 1, T1/T2  -PB      Manual Rx 5 Grade III/IV  -PB      Manual Rx 6    Manual Rx 6 Location Thoracic spine  -PB      Manual Rx 6 Type Manual rotation at spinous process T1/2  -PB      Manual Rx 6 Duration Total manual time 35 min  -PB        User Key  (r) = Recorded By, (t) = Taken By, (c) = Cosigned By    Initials Name Provider Type    PB Carola Gutiérrez PT Physical Therapist                             Time Calculation:   Start Time: 0900  Stop Time: 0935  Time Calculation (min): 35 min    Therapy Charges for Today     Code Description Service Date Service Provider Modifiers Qty    22831489486 HC PT MANUAL THERAPY EA 15 MIN 1/4/2018 Carola Gutiérrez, PT GP 2                    Carola Gutiérrez,  PT  1/4/2018

## 2018-01-09 ENCOUNTER — HOSPITAL ENCOUNTER (OUTPATIENT)
Dept: PHYSICAL THERAPY | Facility: HOSPITAL | Age: 63
Setting detail: THERAPIES SERIES
Discharge: HOME | End: 2018-01-09

## 2018-01-09 DIAGNOSIS — M54.2 NECK PAIN: Primary | ICD-10-CM

## 2018-01-09 PROCEDURE — 97140 MANUAL THERAPY 1/> REGIONS: CPT | Performed by: PHYSICAL THERAPIST

## 2018-01-09 NOTE — THERAPY TREATMENT NOTE
Outpatient Physical Therapy Ortho Treatment Note  [facility]     Patient Name: Ed Betancur  : 1955  MRN: [id number]  Today's Date: 2018      Visit Date: 2018    Visit Dx:    ICD-10-CM ICD-9-CM   1. Neck pain M54.2 723.1       Patient Active Problem List   Diagnosis   • Precordial pain   • Near syncope   • SVT (supraventricular tachycardia)   • MVP (mitral valve prolapse)        Past Medical History:   Diagnosis Date   • Acute bronchitis    • Chest pain    • Epigastric pain    • Fatigue    • Headache    • Health care maintenance    • Insomnia    • Muscle spasm    • Pain, upper back    • Postmenopausal    • Spasm of esophagus         Past Surgical History:   Procedure Laterality Date   • BREAST SURGERY     •  SECTION     • HX OVARIAN CYSTECTOMY     • HYSTEROSCOPY ENDOMETRIAL ABLATION     • OVARIAN CYST REMOVAL     • TONSILLECTOMY                               PT Assessment/Plan       18 1629       PT Assessment    Assessment Comments Patient overall symptoms improving, states she is now able to put on a pullover shirt without grimacing now.  She demonstrates increasing cervical ROM, painful at end range.  Still some increased muscle tension R UT/levator tissues but notable improvement from when she started.  -RA     PT Plan    PT Plan Comments Continue with manual techniques, cervical joint mobs for cervical ROM with reduced pain  -RA       User Key  (r) = Recorded By, (t) = Taken By, (c) = Cosigned By    Initials Name Provider Type    DIANA Montemayor, PT Physical Therapist                    Exercises       18 1600          Subjective Comments    Subjective Comments Still feel it at rest - constant burning but better than it was.  My motion is improving but I still get pain if I move too far and sometimes sharp pain when I move the wrong way.    -RA      Subjective Pain    Able to rate subjective pain? yes  -RA      Pre-Treatment Pain Level --   3-4/10  -RA       Exercise 1    Exercise Name 1 Continue with current HEP.   -RA        User Key  (r) = Recorded By, (t) = Taken By, (c) = Cosigned By    Initials Name Provider Type    DIANA Montemayor PT Physical Therapist                        Manual Rx (last 36 hours)      Manual Treatments       01/09/18 1500          Manual Rx 1    Manual Rx 1 Location Cervical spine  -RA      Manual Rx 1 Type Upslides to R C6/7, C7/T1  -RA      Manual Rx 1 Grade Varied with added cervical flexion with elevating head support on table  -RA      Manual Rx 2    Manual Rx 2 Location Cervical spine  -RA      Manual Rx 2 Type Downslides to R C6/7, C7/T1  -RA      Manual Rx 3    Manual Rx 3 Location Cervical spine  -RA      Manual Rx 3 Type Manual long axis distraction  -RA      Manual Rx 3 Grade Moderate pull  -RA      Manual Rx 3 Duration 60”/3  -RA      Manual Rx 4    Manual Rx 4 Location Cervical spine  -RA      Manual Rx 4 Type Contract relax with L cervical rotation  -RA      Manual Rx 4 Grade Varied cervical flexion with table  -RA      Manual Rx 5    Manual Rx 5 Location Cervical / thoracic spine  -RA      Manual Rx 5 Type PA in prone to R C7/T 1, T1/T2  -RA      Manual Rx 5 Grade III/IV  -RA      Manual Rx 6    Manual Rx 6 Location Thoracic spine  -RA      Manual Rx 6 Type Manual rotation at spinous process T1/2  -RA      Manual Rx 7    Manual Rx 7 Location cervical spine  -RA      Manual Rx 7 Type STM  -RA      Manual Rx 7 Grade R lower cervical/upper thoracic PVM; LS; UT  -RA      Manual Rx 7 Duration Total manual time 36 min  -RA        User Key  (r) = Recorded By, (t) = Taken By, (c) = Cosigned By    Initials Name Provider Type    DIANA Montemayor PT Physical Therapist              Therapy Education  Given: Symptoms/condition management, Posture/body mechanics  Program: Reinforced  How Provided: Verbal, Demonstration  Provided to: Patient  Level of Understanding: Teach back education performed, Verbalized              Time  Calculation:   Start Time: 1437  Stop Time: 1513  Time Calculation (min): 36 min    Therapy Charges for Today     Code Description Service Date Service Provider Modifiers Qty    96047362886 HC PT MANUAL THERAPY EA 15 MIN 1/9/2018 Macie Montemayor, PT GP 2                    Macie Montemayor, PT  1/9/2018

## 2018-01-16 ENCOUNTER — HOSPITAL ENCOUNTER (OUTPATIENT)
Dept: PHYSICAL THERAPY | Facility: HOSPITAL | Age: 63
Setting detail: THERAPIES SERIES
Discharge: HOME | End: 2018-01-16

## 2018-01-16 DIAGNOSIS — M54.2 NECK PAIN: Primary | ICD-10-CM

## 2018-01-16 PROCEDURE — 97140 MANUAL THERAPY 1/> REGIONS: CPT | Performed by: PHYSICAL THERAPIST

## 2018-01-16 NOTE — THERAPY TREATMENT NOTE
Outpatient Physical Therapy Ortho Treatment Note  [facility]     Patient Name: Ed Betancur  : 1955  MRN: [id number]  Today's Date: 2018      Visit Date: 2018    Visit Dx:    ICD-10-CM ICD-9-CM   1. Neck pain M54.2 723.1       Patient Active Problem List   Diagnosis   • Precordial pain   • Near syncope   • SVT (supraventricular tachycardia)   • MVP (mitral valve prolapse)        Past Medical History:   Diagnosis Date   • Acute bronchitis    • Chest pain    • Epigastric pain    • Fatigue    • Headache    • Health care maintenance    • Insomnia    • Muscle spasm    • Pain, upper back    • Postmenopausal    • Spasm of esophagus         Past Surgical History:   Procedure Laterality Date   • BREAST SURGERY     •  SECTION     • HX OVARIAN CYSTECTOMY     • HYSTEROSCOPY ENDOMETRIAL ABLATION     • OVARIAN CYST REMOVAL     • TONSILLECTOMY                               PT Assessment/Plan       18 1518       PT Assessment    Assessment Comments Patient reports significant pain decrease and shows increasing cervical ROM that she is able to perform ADLs without increasing pain.  Cervical AROM still not full limited mostly with L rot and R SB at the R lower cervical spine.   -PB       User Key  (r) = Recorded By, (t) = Taken By, (c) = Cosigned By    Initials Name Provider Type    CAYETANO Gutiérrez, PT Physical Therapist                    Exercises       18 1500          Subjective Comments    Subjective Comments Better. It does not hurt unless I move too far.  -PB      Subjective Pain    Able to rate subjective pain? yes  -PB      Pre-Treatment Pain Level 1  -PB      Post-Treatment Pain Level 1  -PB      Exercise 1    Exercise Name 1 Recommend upper body aerobic exercise.  She may use light weight but not overhead until she has restored ROM.  -PB        User Key  (r) = Recorded By, (t) = Taken By, (c) = Cosigned By    Initials Name Provider Type    CAYETANO Gutiérrez, PT Physical  Therapist                        Manual Rx (last 36 hours)      Manual Treatments       01/16/18 1500          Manual Rx 1    Manual Rx 1 Location Cervical spine  -PB      Manual Rx 1 Type Upslides to R C6/7, C7/T1  -PB      Manual Rx 1 Grade Varied with added cervical flexion with elevating head support on table  -PB      Manual Rx 2    Manual Rx 2 Location Cervical spine  -PB      Manual Rx 2 Type Downslides to R C6/7, C7/T1  -PB      Manual Rx 3    Manual Rx 3 Location Cervical spine  -PB      Manual Rx 3 Type Manual long axis distraction  -PB      Manual Rx 3 Grade Moderate pull  -PB      Manual Rx 3 Duration 60”/3  -PB      Manual Rx 4    Manual Rx 4 Location Cervical spine  -PB      Manual Rx 4 Type Contract relax with L cervical rotation  -PB      Manual Rx 4 Grade Varied cervical flexion with table  -PB      Manual Rx 7    Manual Rx 7 Location cervical spine  -PB      Manual Rx 7 Type STM  -PB      Manual Rx 7 Grade R mid/lower cervical PVM  -PB      Manual Rx 7 Duration Total manual time 35 min  -PB        User Key  (r) = Recorded By, (t) = Taken By, (c) = Cosigned By    Initials Name Provider Type    PB Carola Gutiérrez, PT Physical Therapist                PT OP Goals       01/16/18 1500       PT Short Term Goals    STG 1 Patient will be independent with initial HEP for posture and ROM/flexibility without exacerbation of symptoms.   -PB     STG 1 Progress Met  -PB     STG 2 Patient will be educated on and demonstrate knowledge of optimal posture, decompression strategies, ergonomics, good body mechanics, and proper lifting techniques to minimize stresses to spine and soft tissues with daily activities  -PB     STG 2 Progress Met  -PB     STG 3 Patient will have 75% cervical rotation with minimal to no pain for increased ease with driving  -PB     STG 3 Progress Partially Met  -PB     STG 3 Progress Comments Flexion >75%, rotation R 75%, L 2/3; SB 50% B   -PB     Long Term Goals    LTG 1 Patient will be  independent with established HEP for strength/stabilization to facilitate return to desired function  -PB     LTG 1 Progress Ongoing  -PB     LTG 2 Patient will have improved postural/cervical strength for greater ease with activities such as washing/drying hair  -PB     LTG 2 Progress Met  -PB     LTG 3 Patient will report peak pain in neck </= 2/10 with performance of daily activities.  -PB     LTG 3 Progress Progressing  -PB     LTG 4 Patient will report reduced functional limitations on NDI from 24% to </= 14%  -PB     LTG 4 Progress Ongoing  -PB     LTG 5 Patient will be educated on and verbalize/demonstrate knowledge of safe/appropriate gym ex for reduced risk of symptom exacerbation with return to regular exercise routine  -PB     LTG 5 Progress Ongoing  -PB       User Key  (r) = Recorded By, (t) = Taken By, (c) = Cosigned By    Initials Name Provider Type    PB Carola Gutiérrez, PT Physical Therapist                         Time Calculation:   Start Time: 1430  Stop Time: 1505  Time Calculation (min): 35 min    Therapy Charges for Today     Code Description Service Date Service Provider Modifiers Qty    54334078577 HC PT MANUAL THERAPY EA 15 MIN 1/16/2018 Carola Gutiérrez, PT GP 2                    Carola Gutiérrez, PT  1/16/2018

## 2018-02-06 ENCOUNTER — HOSPITAL ENCOUNTER (OUTPATIENT)
Dept: PHYSICAL THERAPY | Facility: HOSPITAL | Age: 63
Setting detail: THERAPIES SERIES
Discharge: HOME | End: 2018-02-06

## 2018-02-06 DIAGNOSIS — M54.2 NECK PAIN: Primary | ICD-10-CM

## 2018-02-06 PROCEDURE — G8982 BODY POS GOAL STATUS: HCPCS | Performed by: PHYSICAL THERAPIST

## 2018-02-06 PROCEDURE — G8981 BODY POS CURRENT STATUS: HCPCS | Performed by: PHYSICAL THERAPIST

## 2018-02-06 PROCEDURE — 97140 MANUAL THERAPY 1/> REGIONS: CPT | Performed by: PHYSICAL THERAPIST

## 2018-02-06 NOTE — THERAPY PROGRESS REPORT/RE-CERT
Outpatient Physical Therapy Ortho Re-Assessment  [facility]     Patient Name: Ed Betancur  : 1955  MRN: [id number]  Today's Date: 2018      Visit Date: 2018    Patient Active Problem List   Diagnosis   • Precordial pain   • Near syncope   • SVT (supraventricular tachycardia)   • MVP (mitral valve prolapse)        Past Medical History:   Diagnosis Date   • Acute bronchitis    • Chest pain    • Epigastric pain    • Fatigue    • Headache    • Health care maintenance    • Insomnia    • Muscle spasm    • Pain, upper back    • Postmenopausal    • Spasm of esophagus         Past Surgical History:   Procedure Laterality Date   • BREAST SURGERY     •  SECTION     • HX OVARIAN CYSTECTOMY     • HYSTEROSCOPY ENDOMETRIAL ABLATION     • OVARIAN CYST REMOVAL     • TONSILLECTOMY         Visit Dx:     ICD-10-CM ICD-9-CM   1. Neck pain M54.2 723.1                                       PT OP Goals       18 1200       PT Short Term Goals    STG 1 Patient will be independent with initial HEP for posture and ROM/flexibility without exacerbation of symptoms.   -PB     STG 1 Progress Met  -PB     STG 2 Patient will be educated on and demonstrate knowledge of optimal posture, decompression strategies, ergonomics, good body mechanics, and proper lifting techniques to minimize stresses to spine and soft tissues with daily activities  -PB     STG 2 Progress Met  -PB     STG 3 Patient will have 75% cervical rotation with minimal to no pain for increased ease with driving  -PB     STG 3 Progress Partially Met  -PB     STG 3 Progress Comments Flexion 75%, rotation R 75%, L 2/3; SB 50% B   -PB     Long Term Goals    LTG 1 Patient will be independent with established HEP for strength/stabilization to facilitate return to desired function  -PB     LTG 1 Progress Ongoing  -PB     LTG 2 Patient will have improved postural/cervical strength for greater ease with activities such as washing/drying hair  -PB     LTG  2 Progress Met  -PB     LTG 3 Patient will report peak pain in neck </= 2/10 with performance of daily activities.  -PB     LTG 3 Progress Progressing  -PB     LTG 3 Progress Comments 2/10 at rest and increased with movements  -PB     LTG 4 Patient will report reduced functional limitations on NDI from 24% to </= 14%  -PB     LTG 4 Progress Ongoing  -PB     LTG 5 Patient will be educated on and verbalize/demonstrate knowledge of safe/appropriate gym ex for reduced risk of symptom exacerbation with return to regular exercise routine  -PB     LTG 5 Progress Ongoing  -PB       User Key  (r) = Recorded By, (t) = Taken By, (c) = Cosigned By    Initials Name Provider Type    PB Carola Gutiérrez, PT Physical Therapist                PT Assessment/Plan       02/06/18 1220       PT Assessment    Functional Limitations Limitation in home management;Limitations in community activities;Performance in leisure activities;Performance in self-care ADL;Performance in work activities  -PB     Impairments Posture;Range of motion;Muscle strength;Endurance  -PB     Assessment Comments Mrs. Betancur has been seen for 6 physical therapy sessions for cervical strain.  She has received manual therapy for joint and soft tissue mobilization to help restore painfree cervical ROM and strength.  She reports decreased resting pain although did have a flare about 1 week ago.  She has increased cervical AROM but not restored to full ROM with lower cervical joint stiffness still limiting to side bending with impingement. She will benefit to continue physical therapy to return to prior normal mobility.   -PB     Please refer to paper survey for additional self-reported information Yes  -PB     Rehab Potential Good  -PB     Patient/caregiver participated in establishment of treatment plan and goals Yes  -PB     Patient would benefit from skilled therapy intervention Yes  -PB     PT Plan    PT Frequency 1x/week  -PB     Predicted Duration of Therapy  Intervention (days/wks) 30 days   -PB     Planned CPT's? PT MANUAL THERAPY EA 15 MIN: 67237;PT THER PROC EA 15 MIN: 92951;PT ULTRASOUND EA 15 MIN: 67919;PT HOT OR COLD PACK TREAT MCARE  -PB       User Key  (r) = Recorded By, (t) = Taken By, (c) = Cosigned By    Initials Name Provider Type    PB Carola Gutiérrez PT Physical Therapist                 Manual Rx (last 36 hours)      Manual Treatments       02/06/18 1100          Manual Rx 1    Manual Rx 1 Location Cervical spine  -PB      Manual Rx 1 Type Upslides to R and L C6/7, C7/T1  -PB      Manual Rx 1 Grade Varied with added cervical flexion with elevating head support on table  -PB      Manual Rx 2    Manual Rx 2 Location Cervical spine  -PB      Manual Rx 2 Type Downslides to R and L C6/7, C7/T1  -PB      Manual Rx 3    Manual Rx 3 Location Cervical spine  -PB      Manual Rx 3 Type Manual long axis distraction  -PB      Manual Rx 3 Grade Moderate pull  -PB      Manual Rx 3 Duration 60”/3  -PB      Manual Rx 5    Manual Rx 5 Location Cervical / thoracic spine  -PB      Manual Rx 5 Type PA in prone to R C6/7, C7, T1  -PB      Manual Rx 5 Grade III/IV  -PB      Manual Rx 6    Manual Rx 6 Location Thoracic spine  -PB      Manual Rx 6 Type Manual rotation at spinous process C6/7, C7/T1  -PB      Manual Rx 7    Manual Rx 7 Location Cervical/thoracic spine  -PB      Manual Rx 7 Type STM  -PB      Manual Rx 7 Grade B PVM  -PB      Manual Rx 7 Duration Total manual time 40 min  -PB        User Key  (r) = Recorded By, (t) = Taken By, (c) = Cosigned By    Initials Name Provider Type    PB Carola Gutiérrez PT Physical Therapist                                Time Calculation:   Start Time: 0901  Stop Time: 0943  Time Calculation (min): 42 min     Therapy Charges for Today     Code Description Service Date Service Provider Modifiers Qty    86146624428  PT [facility] MAIN POS CURRENT 2/6/2018 Carola Gutiérrez PT GP, CJ 1    [id number]  PT [facility] MAIN POS PROJECTED 2/6/2018 Carola  URI Gutiérrez, PT GP, CI 1    [id number] HC PT MANUAL THERAPY EA 15 MIN 2/6/2018 Carola Gutiérrez, PT GP 3          PT G-Codes  Functional Limitation: Changing and maintaining body position  Changing and Maintaining Body Position Current Status (): At least 20 percent but less than 40 percent impaired, limited or restricted  Changing and Maintaining Body Position Goal Status (): At least 1 percent but less than 20 percent impaired, limited or restricted         Carola Gutiérrez, PT  2/6/2018

## 2018-02-13 ENCOUNTER — HOSPITAL ENCOUNTER (OUTPATIENT)
Dept: PHYSICAL THERAPY | Facility: HOSPITAL | Age: 63
Setting detail: THERAPIES SERIES
Discharge: HOME | End: 2018-02-13

## 2018-02-13 DIAGNOSIS — M54.2 NECK PAIN: Primary | ICD-10-CM

## 2018-02-13 PROCEDURE — 97140 MANUAL THERAPY 1/> REGIONS: CPT | Performed by: PHYSICAL THERAPIST

## 2018-02-13 NOTE — THERAPY TREATMENT NOTE
Outpatient Physical Therapy Ortho Treatment Note  [facility]     Patient Name: Ed Betancur  : 1955  MRN: [id number]  Today's Date: 2018      Visit Date: 2018    Visit Dx:    ICD-10-CM ICD-9-CM   1. Neck pain M54.2 723.1       Patient Active Problem List   Diagnosis   • Precordial pain   • Near syncope   • SVT (supraventricular tachycardia)   • MVP (mitral valve prolapse)        Past Medical History:   Diagnosis Date   • Acute bronchitis    • Chest pain    • Epigastric pain    • Fatigue    • Headache    • Health care maintenance    • Insomnia    • Muscle spasm    • Pain, upper back    • Postmenopausal    • Spasm of esophagus         Past Surgical History:   Procedure Laterality Date   • BREAST SURGERY     •  SECTION     • HX OVARIAN CYSTECTOMY     • HYSTEROSCOPY ENDOMETRIAL ABLATION     • OVARIAN CYST REMOVAL     • TONSILLECTOMY                               PT Assessment/Plan       18 0948       PT Assessment    Assessment Comments Primary limitations in side bending ROM more limiting to R than L.  ROM had felt better mid way through session but more irritated at end after working posterior R cervical / thoracic spine where she is quite tender and tight.   -PB       User Key  (r) = Recorded By, (t) = Taken By, (c) = Cosigned By    Initials Name Provider Type    PB Carola Gutiérrez, PT Physical Therapist                    Exercises       18 0900          Subjective Comments    Subjective Comments 10-15% better after last week  -PB      Subjective Pain    Able to rate subjective pain? yes  -PB      Pre-Treatment Pain Level 1  -PB      Post-Treatment Pain Level 2  -PB      Subjective Pain Comment Increased pain with side bending ROM  -PB        User Key  (r) = Recorded By, (t) = Taken By, (c) = Cosigned By    Initials Name Provider Type    CAYETANO Gutiérrez PT Physical Therapist                        Manual Rx (last 36 hours)      Manual Treatments       18 0900     "      Manual Rx 1    Manual Rx 1 Location Cervical spine  -PB      Manual Rx 1 Type Downslides R and L C4/5, C5/6, C6/7   -PB      Manual Rx 2    Manual Rx 2 Location Cervical spine   -PB      Manual Rx 2 Type Upslides to R and L C4/5, C5/6, C6/7   -PB      Manual Rx 3    Manual Rx 3 Location Cervical spine  -PB      Manual Rx 3 Type Manual long axis distraction  -PB      Manual Rx 3 Grade Moderate pull  -PB      Manual Rx 3 Duration 60”/1  -PB      Manual Rx 4    Manual Rx 4 Location Cervical spine   -PB      Manual Rx 4 Type Passive stretching to cervical side bending and rotation   -PB      Manual Rx 4 Duration 30\"/3 B   -PB      Manual Rx 5    Manual Rx 5 Location Cervical / thoracic spine  -PB      Manual Rx 5 Type PA in prone to R C6/7, C7, T1  -PB      Manual Rx 5 Grade III/IV  -PB      Manual Rx 7    Manual Rx 7 Location Cervical/thoracic spine  -PB      Manual Rx 7 Type STM to R UT, LS, interscapular PVM  -PB      Manual Rx 7 Grade Medium intensity   -PB      Manual Rx 7 Duration Total manual time 40 min  -PB        User Key  (r) = Recorded By, (t) = Taken By, (c) = Cosigned By    Initials Name Provider Type    PB Carola Gutiérrez, PT Physical Therapist                             Time Calculation:   Start Time: 0900  Stop Time: 0942  Time Calculation (min): 42 min    Therapy Charges for Today     Code Description Service Date Service Provider Modifiers Qty    [id number]  PT MANUAL THERAPY EA 15 MIN 2/13/2018 Carola Gutiérrez, PT GP 3                    Carola Gutiérrez, PT  2/13/2018     "

## 2018-02-20 ENCOUNTER — HOSPITAL ENCOUNTER (OUTPATIENT)
Dept: CARDIOLOGY | Facility: HOSPITAL | Age: 63
Discharge: HOME | End: 2018-02-20
Attending: INTERNAL MEDICINE | Admitting: INTERNAL MEDICINE

## 2018-02-20 VITALS
WEIGHT: 125 LBS | HEART RATE: 87 BPM | SYSTOLIC BLOOD PRESSURE: 138 MMHG | DIASTOLIC BLOOD PRESSURE: 76 MMHG | BODY MASS INDEX: 18.94 KG/M2 | HEIGHT: 68 IN

## 2018-02-20 DIAGNOSIS — I34.0 NON-RHEUMATIC MITRAL REGURGITATION: ICD-10-CM

## 2018-02-20 DIAGNOSIS — I34.1 MVP (MITRAL VALVE PROLAPSE): ICD-10-CM

## 2018-02-20 LAB
ASCENDING AORTA: 2.9 CM
BH CV ECHO MEAS - ACS: 2 CM
BH CV ECHO MEAS - AO MAX PG (FULL): 1.8 MMHG
BH CV ECHO MEAS - AO MAX PG: 4.3 MMHG
BH CV ECHO MEAS - AO MEAN PG (FULL): 1.1 MMHG
BH CV ECHO MEAS - AO MEAN PG: 2.7 MMHG
BH CV ECHO MEAS - AO ROOT AREA (BSA CORRECTED): 1.8
BH CV ECHO MEAS - AO ROOT AREA: 6.9 CM^2
BH CV ECHO MEAS - AO ROOT DIAM: 3 CM
BH CV ECHO MEAS - AO V2 MAX: 104.2 CM/SEC
BH CV ECHO MEAS - AO V2 MEAN: 78.7 CM/SEC
BH CV ECHO MEAS - AO V2 VTI: 23.5 CM
BH CV ECHO MEAS - AVA(I,A): 1.9 CM^2
BH CV ECHO MEAS - AVA(I,D): 1.9 CM^2
BH CV ECHO MEAS - AVA(V,A): 2 CM^2
BH CV ECHO MEAS - AVA(V,D): 2 CM^2
BH CV ECHO MEAS - BSA(HAYCOCK): 1.6 M^2
BH CV ECHO MEAS - BSA: 1.7 M^2
BH CV ECHO MEAS - BZI_BMI: 19 KILOGRAMS/M^2
BH CV ECHO MEAS - BZI_METRIC_HEIGHT: 172.7 CM
BH CV ECHO MEAS - BZI_METRIC_WEIGHT: 56.7 KG
BH CV ECHO MEAS - CONTRAST EF (2CH): 63 ML/M^2
BH CV ECHO MEAS - CONTRAST EF 4CH: 62 ML/M^2
BH CV ECHO MEAS - EDV(MOD-SP2): 54 ML
BH CV ECHO MEAS - EDV(MOD-SP4): 50 ML
BH CV ECHO MEAS - EDV(TEICH): 81.4 ML
BH CV ECHO MEAS - EF(CUBED): 80.8 %
BH CV ECHO MEAS - EF(MOD-SP2): 63 %
BH CV ECHO MEAS - EF(MOD-SP4): 62 %
BH CV ECHO MEAS - EF(TEICH): 73.7 %
BH CV ECHO MEAS - ESV(MOD-SP2): 20 ML
BH CV ECHO MEAS - ESV(MOD-SP4): 19 ML
BH CV ECHO MEAS - ESV(TEICH): 21.4 ML
BH CV ECHO MEAS - IVS/LVPW: 0.81
BH CV ECHO MEAS - IVSD: 0.85 CM
BH CV ECHO MEAS - LAT PEAK E' VEL: 6 CM/SEC
BH CV ECHO MEAS - LV DIASTOLIC VOL/BSA (35-75): 29.9 ML/M^2
BH CV ECHO MEAS - LV MASS(C)D: 129.2 GRAMS
BH CV ECHO MEAS - LV MASS(C)DI: 77.2 GRAMS/M^2
BH CV ECHO MEAS - LV MAX PG: 2.6 MMHG
BH CV ECHO MEAS - LV MEAN PG: 1.6 MMHG
BH CV ECHO MEAS - LV SYSTOLIC VOL/BSA (12-30): 11.4 ML/M^2
BH CV ECHO MEAS - LV V1 MAX: 80 CM/SEC
BH CV ECHO MEAS - LV V1 MEAN: 58.8 CM/SEC
BH CV ECHO MEAS - LV V1 VTI: 17 CM
BH CV ECHO MEAS - LVIDD: 4.3 CM
BH CV ECHO MEAS - LVIDS: 2.5 CM
BH CV ECHO MEAS - LVLD AP2: 6.5 CM
BH CV ECHO MEAS - LVLD AP4: 6.2 CM
BH CV ECHO MEAS - LVLS AP2: 5.9 CM
BH CV ECHO MEAS - LVLS AP4: 5.4 CM
BH CV ECHO MEAS - LVOT AREA (M): 2.5 CM^2
BH CV ECHO MEAS - LVOT AREA: 2.7 CM^2
BH CV ECHO MEAS - LVOT DIAM: 1.8 CM
BH CV ECHO MEAS - LVPWD: 1 CM
BH CV ECHO MEAS - MED PEAK E' VEL: 6 CM/SEC
BH CV ECHO MEAS - MR MAX PG: 31.6 MMHG
BH CV ECHO MEAS - MR MAX VEL: 281 CM/SEC
BH CV ECHO MEAS - MV A DUR: 0.12 SEC
BH CV ECHO MEAS - MV A MAX VEL: 55.8 CM/SEC
BH CV ECHO MEAS - MV DEC SLOPE: 155.2 CM/SEC^2
BH CV ECHO MEAS - MV DEC TIME: 0.35 SEC
BH CV ECHO MEAS - MV E MAX VEL: 50.1 CM/SEC
BH CV ECHO MEAS - MV E/A: 0.9
BH CV ECHO MEAS - MV MAX PG: 1.9 MMHG
BH CV ECHO MEAS - MV MEAN PG: 0.89 MMHG
BH CV ECHO MEAS - MV P1/2T MAX VEL: 50.9 CM/SEC
BH CV ECHO MEAS - MV P1/2T: 96.1 MSEC
BH CV ECHO MEAS - MV V2 MAX: 69.5 CM/SEC
BH CV ECHO MEAS - MV V2 MEAN: 44.5 CM/SEC
BH CV ECHO MEAS - MV V2 VTI: 14.4 CM
BH CV ECHO MEAS - MVA P1/2T LCG: 4.3 CM^2
BH CV ECHO MEAS - MVA(P1/2T): 2.3 CM^2
BH CV ECHO MEAS - MVA(VTI): 3.1 CM^2
BH CV ECHO MEAS - PA ACC TIME: 0.11 SEC
BH CV ECHO MEAS - PA MAX PG (FULL): 0.13 MMHG
BH CV ECHO MEAS - PA MAX PG: 1.7 MMHG
BH CV ECHO MEAS - PA PR(ACCEL): 29.9 MMHG
BH CV ECHO MEAS - PA V2 MAX: 65.2 CM/SEC
BH CV ECHO MEAS - PULM A REVS DUR: 0.14 SEC
BH CV ECHO MEAS - PULM A REVS VEL: 38.3 CM/SEC
BH CV ECHO MEAS - PULM DIAS VEL: 47.1 CM/SEC
BH CV ECHO MEAS - PULM S/D: 1.1
BH CV ECHO MEAS - PULM SYS VEL: 51.9 CM/SEC
BH CV ECHO MEAS - PVA(V,A): 2.3 CM^2
BH CV ECHO MEAS - PVA(V,D): 2.3 CM^2
BH CV ECHO MEAS - QP/QS: 0.75
BH CV ECHO MEAS - RAP SYSTOLE: 8 MMHG
BH CV ECHO MEAS - RV MAX PG: 1.6 MMHG
BH CV ECHO MEAS - RV MEAN PG: 1.1 MMHG
BH CV ECHO MEAS - RV V1 MAX: 62.6 CM/SEC
BH CV ECHO MEAS - RV V1 MEAN: 49.5 CM/SEC
BH CV ECHO MEAS - RV V1 VTI: 14.3 CM
BH CV ECHO MEAS - RVOT AREA: 2.4 CM^2
BH CV ECHO MEAS - RVOT DIAM: 1.7 CM
BH CV ECHO MEAS - RVSP: 19 MMHG
BH CV ECHO MEAS - SI(AO): 96.6 ML/M^2
BH CV ECHO MEAS - SI(CUBED): 37.4 ML/M^2
BH CV ECHO MEAS - SI(LVOT): 27 ML/M^2
BH CV ECHO MEAS - SI(MOD-SP2): 20.3 ML/M^2
BH CV ECHO MEAS - SI(MOD-SP4): 18.5 ML/M^2
BH CV ECHO MEAS - SI(TEICH): 35.9 ML/M^2
BH CV ECHO MEAS - SUP REN AO DIAM: 1.8 CM
BH CV ECHO MEAS - SV(AO): 161.8 ML
BH CV ECHO MEAS - SV(CUBED): 62.6 ML
BH CV ECHO MEAS - SV(LVOT): 45.2 ML
BH CV ECHO MEAS - SV(MOD-SP2): 34 ML
BH CV ECHO MEAS - SV(MOD-SP4): 31 ML
BH CV ECHO MEAS - SV(RVOT): 34 ML
BH CV ECHO MEAS - SV(TEICH): 60 ML
BH CV ECHO MEAS - TAPSE (>1.6): 1.4 CM2
BH CV ECHO MEAS - TR MAX VEL: 169.7 CM/SEC
BH CV XLRA - RV BASE: 2.1 CM
BH CV XLRA - TDI S': 11 CM/SEC
E/E' RATIO: 8
LEFT ATRIUM VOLUME INDEX: 19 ML/M2
LV EF 2D ECHO EST: 62 %
MAXIMAL PREDICTED HEART RATE: 155 BPM
SINUS: 2.5 CM
STJ: 2.4 CM
STRESS TARGET HR: 132 BPM

## 2018-02-20 PROCEDURE — 93306 TTE W/DOPPLER COMPLETE: CPT | Performed by: INTERNAL MEDICINE

## 2018-02-20 PROCEDURE — 93306 TTE W/DOPPLER COMPLETE: CPT

## 2018-02-21 ENCOUNTER — HOSPITAL ENCOUNTER (OUTPATIENT)
Dept: PHYSICAL THERAPY | Facility: HOSPITAL | Age: 63
Setting detail: THERAPIES SERIES
Discharge: HOME | End: 2018-02-21

## 2018-02-21 DIAGNOSIS — M54.2 NECK PAIN: Primary | ICD-10-CM

## 2018-02-21 PROCEDURE — 97140 MANUAL THERAPY 1/> REGIONS: CPT | Performed by: PHYSICAL THERAPIST

## 2018-02-21 NOTE — THERAPY TREATMENT NOTE
Outpatient Physical Therapy Ortho Treatment Note  [facility]     Patient Name: Ed Betancur  : 1955  MRN: [id number]  Today's Date: 2018      Visit Date: 2018    Visit Dx:    ICD-10-CM ICD-9-CM   1. Neck pain M54.2 723.1       Patient Active Problem List   Diagnosis   • Precordial pain   • Near syncope   • SVT (supraventricular tachycardia)   • MVP (mitral valve prolapse)        Past Medical History:   Diagnosis Date   • Acute bronchitis    • Chest pain    • Epigastric pain    • Fatigue    • Headache    • Health care maintenance    • Insomnia    • Muscle spasm    • Pain, upper back    • Postmenopausal    • Spasm of esophagus         Past Surgical History:   Procedure Laterality Date   • BREAST SURGERY     •  SECTION     • HX OVARIAN CYSTECTOMY     • HYSTEROSCOPY ENDOMETRIAL ABLATION     • OVARIAN CYST REMOVAL     • TONSILLECTOMY                               PT Assessment/Plan       18 1336       PT Assessment    Assessment Comments There is increased cervical AROM still with some expected post treatment pain after dry needling that is a good initial response   -PB       User Key  (r) = Recorded By, (t) = Taken By, (c) = Cosigned By    Initials Name Provider Type    CAYETANO Gutiérrez, PT Physical Therapist                Modalities       18 1300          Ice    Location R cervical /thoracic   -PB      Rx Minutes 10 mins  -PB      Ice S/P Rx Yes  -PB        User Key  (r) = Recorded By, (t) = Taken By, (c) = Cosigned By    Initials Name Provider Type    CAYETANO Gutiérrez, PT Physical Therapist                Exercises       18 1300          Subjective Comments    Subjective Comments There is improvement but still stiffness   -PB      Subjective Pain    Able to rate subjective pain? yes  -PB      Pre-Treatment Pain Level 1  -PB      Post-Treatment Pain Level 2  -PB        User Key  (r) = Recorded By, (t) = Taken By, (c) = Cosigned By    Initials Name Provider Type     PB Carola Gutiérrez, PT Physical Therapist                        Manual Rx (last 36 hours)      Manual Treatments       02/21/18 1300          Manual Rx 1    Manual Rx 1 Location Cervical spine  -PB      Manual Rx 1 Type Downslides R and L C4/5, C5/6, C6/7   -PB      Manual Rx 2    Manual Rx 2 Location Cervical spine   -PB      Manual Rx 2 Type Upslides to R and L C4/5, C5/6, C6/7   -PB      Manual Rx 3    Manual Rx 3 Location R UT   -PB      Manual Rx 3 Type Dry needling   -PB      Manual Rx 3 Grade Positive twitch responses   -PB      Manual Rx 3 Duration 15  -PB      Manual Rx 4    Manual Rx 4 Location R UT, LS   -PB      Manual Rx 4 Type STM   -PB      Manual Rx 4 Duration 20  -PB        User Key  (r) = Recorded By, (t) = Taken By, (c) = Cosigned By    Initials Name Provider Type    PB Carola Gutiérrez, PT Physical Therapist                             Time Calculation:   Start Time: 0900  Stop Time: 0945  Time Calculation (min): 45 min    Therapy Charges for Today     Code Description Service Date Service Provider Modifiers Qty    70228170838  PT MANUAL THERAPY EA 15 MIN 2/21/2018 Carola Gutiérrez, PT GP 3                    Carola Gutiérrez, PT  2/21/2018

## 2018-02-23 ENCOUNTER — TELEPHONE (OUTPATIENT)
Dept: CARDIOLOGY | Facility: CLINIC | Age: 63
End: 2018-02-23

## 2018-02-23 NOTE — TELEPHONE ENCOUNTER
I talked to patient regarding test results.  Follow-up with me in one year, echo in 2-3 years as long as asymptomatic..     Please arrange follow-up with me in one year. manuel

## 2018-02-27 ENCOUNTER — APPOINTMENT (OUTPATIENT)
Dept: PHYSICAL THERAPY | Facility: HOSPITAL | Age: 63
End: 2018-02-27

## 2018-03-07 ENCOUNTER — HOSPITAL ENCOUNTER (OUTPATIENT)
Dept: PHYSICAL THERAPY | Facility: HOSPITAL | Age: 63
Setting detail: THERAPIES SERIES
Discharge: HOME | End: 2018-03-07

## 2018-03-07 DIAGNOSIS — M54.2 NECK PAIN: Primary | ICD-10-CM

## 2018-03-07 PROCEDURE — 97140 MANUAL THERAPY 1/> REGIONS: CPT | Performed by: PHYSICAL THERAPIST

## 2018-03-07 NOTE — THERAPY PROGRESS REPORT/RE-CERT
Outpatient Physical Therapy Ortho Re-Assessment  [facility]     Patient Name: Ed Betancur  : 1955  MRN: [id number]  Today's Date: 3/7/2018      Visit Date: 2018    Patient Active Problem List   Diagnosis   • Precordial pain   • Near syncope   • SVT (supraventricular tachycardia)   • MVP (mitral valve prolapse)        Past Medical History:   Diagnosis Date   • Acute bronchitis    • Chest pain    • Epigastric pain    • Fatigue    • Headache    • Health care maintenance    • Insomnia    • Muscle spasm    • Pain, upper back    • Postmenopausal    • Spasm of esophagus         Past Surgical History:   Procedure Laterality Date   • BREAST SURGERY     •  SECTION     • HX OVARIAN CYSTECTOMY     • HYSTEROSCOPY ENDOMETRIAL ABLATION     • OVARIAN CYST REMOVAL     • TONSILLECTOMY         Visit Dx:     ICD-10-CM ICD-9-CM   1. Neck pain M54.2 723.1                                       PT OP Goals       18 1600       PT Short Term Goals    STG 1 Patient will be independent with initial HEP for posture and ROM/flexibility without exacerbation of symptoms.   -PB     STG 1 Progress Met  -PB     STG 2 Patient will be educated on and demonstrate knowledge of optimal posture, decompression strategies, ergonomics, good body mechanics, and proper lifting techniques to minimize stresses to spine and soft tissues with daily activities  -PB     STG 2 Progress Met  -PB     STG 3 Patient will have 75% cervical rotation with minimal to no pain for increased ease with driving  -PB     STG 3 Progress Partially Met  -PB     STG 3 Progress Comments R cervical pain and stiffness limiting to SB 50% B, rotation 50-75%  -PB     Long Term Goals    LTG 1 Patient will be independent with established HEP for strength/stabilization to facilitate return to desired function  -PB     LTG 1 Progress Ongoing  -PB     LTG 2 Patient will have improved postural/cervical strength for greater ease with activities such as  washing/drying hair  -PB     LTG 2 Progress Met  -PB     LTG 3 Patient will report peak pain in neck </= 2/10 with performance of daily activities.  -PB     LTG 3 Progress Progressing  -PB     LTG 3 Progress Comments 0-2/10 pain at rest, pain increased above 2/10 with ROM  -PB     LTG 4 Patient will report reduced functional limitations on NDI from 24% to </= 14%  -PB     LTG 4 Progress Ongoing  -PB     LTG 5 Patient will be educated on and verbalize/demonstrate knowledge of safe/appropriate gym ex for reduced risk of symptom exacerbation with return to regular exercise routine  -PB     LTG 5 Progress Ongoing  -PB       User Key  (r) = Recorded By, (t) = Taken By, (c) = Cosigned By    Initials Name Provider Type    PB Carola Gutiérrez, PT Physical Therapist                PT Assessment/Plan       03/07/18 2823       PT Assessment    Functional Limitations Limitations in functional capacity and performance  -PB     Impairments Pain;Joint mobility;Range of motion  -PB     Assessment Comments Mrs. Betancur has been seen for 3 additional sessions this past month for cervical pain and stiffess on the right side of her neck.  She has received manual therapy and she performs flexibility HEP.  She has noted lower cervical joint stiffness limiting her ROM with painful rotation and side bending.  Improvement has been slower this past month without resolving pain and stiffness that she did not have prior to onset.  She may benefit from further testing to assess if she has complicating cervical arthritis.   -PB     Please refer to paper survey for additional self-reported information Yes  -PB     Rehab Potential Good  -PB     Patient/caregiver participated in establishment of treatment plan and goals Yes  -PB     Patient would benefit from skilled therapy intervention Yes  -PB     PT Plan    PT Frequency 1x/week  -PB     Predicted Duration of Therapy Intervention (days/wks) 30 days   -PB     Planned CPT's? PT THER PROC EA 15 MIN:  "49594;PT MANUAL THERAPY EA 15 MIN: 44045;PT TRACTION CERVICAL: 62181;PT HOT OR COLD PACK TREAT MCARE;PT ULTRASOUND EA 15 MIN: 54753  -PB       User Key  (r) = Recorded By, (t) = Taken By, (c) = Cosigned By    Initials Name Provider Type    PB Carola Gutiérrez, PT Physical Therapist                  Exercises       03/07/18 1600          Subjective Comments    Subjective Comments Sore for several days after last session with some small improvement.   -PB      Subjective Pain    Able to rate subjective pain? yes  -PB      Pre-Treatment Pain Level 2  -PB      Post-Treatment Pain Level 1  -PB      Subjective Pain Comment Increased pain with end ROM   -PB        User Key  (r) = Recorded By, (t) = Taken By, (c) = Cosigned By    Initials Name Provider Type    PB Carola Gutiérrez, PT Physical Therapist           Manual Rx (last 36 hours)      Manual Treatments       03/07/18 1500          Manual Rx 1    Manual Rx 1 Location Cervical spine  -PB      Manual Rx 1 Type Upslides to R and L C6/7, C7/T1  -PB      Manual Rx 1 Grade Varied with added cervical flexion with elevating head support on table  -PB      Manual Rx 2    Manual Rx 2 Location Cervical spine  -PB      Manual Rx 2 Type Downslides to R C6/7, C7/T1  -PB      Manual Rx 3    Manual Rx 3 Location Cervical spine  -PB      Manual Rx 3 Type Manual long axis distraction  -PB      Manual Rx 3 Grade Moderate pull  -PB      Manual Rx 3 Duration 60”/3  -PB      Manual Rx 4    Manual Rx 4 Location Cervical spine   -PB      Manual Rx 4 Type Passive stretching to cervical side bending and rotation   -PB      Manual Rx 4 Duration 30\"/3 B   -PB      Manual Rx 5    Manual Rx 5 Location Cervical / thoracic spine  -PB      Manual Rx 5 Type PA in prone to R C6/7, C7, T1-T4  -PB      Manual Rx 5 Grade III/IV  -PB      Manual Rx 6    Manual Rx 6 Location Cervical spine   -PB      Manual Rx 6 Type STM to R UT   -PB      Manual Rx 6 Duration Total manual time 40 min  -PB        User Key  (r) " = Recorded By, (t) = Taken By, (c) = Cosigned By    Initials Name Provider Type    PB Carola Gutiérrez, PT Physical Therapist                                Time Calculation:   Start Time: 1205  Stop Time: 1255  Time Calculation (min): 50 min     Therapy Charges for Today     Code Description Service Date Service Provider Modifiers Qty    98218285666 HC PT MANUAL THERAPY EA 15 MIN 3/7/2018 Carola Gutiérrez, PT GP 3                    Carola Gutiérrez PT  3/7/2018

## 2018-03-14 ENCOUNTER — HOSPITAL ENCOUNTER (OUTPATIENT)
Dept: PHYSICAL THERAPY | Facility: HOSPITAL | Age: 63
Setting detail: THERAPIES SERIES
Discharge: HOME | End: 2018-03-14

## 2018-03-14 DIAGNOSIS — M54.2 NECK PAIN: Primary | ICD-10-CM

## 2018-03-14 PROCEDURE — 97140 MANUAL THERAPY 1/> REGIONS: CPT | Performed by: PHYSICAL THERAPIST

## 2018-03-14 NOTE — THERAPY TREATMENT NOTE
Outpatient Physical Therapy Ortho Treatment Note  [facility]     Patient Name: Ed Betancur  : 1955  MRN: [id number]  Today's Date: 3/14/2018      Visit Date: 2018    Visit Dx:    ICD-10-CM ICD-9-CM   1. Neck pain M54.2 723.1       Patient Active Problem List   Diagnosis   • Precordial pain   • Near syncope   • SVT (supraventricular tachycardia)   • MVP (mitral valve prolapse)        Past Medical History:   Diagnosis Date   • Acute bronchitis    • Chest pain    • Epigastric pain    • Fatigue    • Headache    • Health care maintenance    • Insomnia    • Muscle spasm    • Pain, upper back    • Postmenopausal    • Spasm of esophagus         Past Surgical History:   Procedure Laterality Date   • BREAST SURGERY     •  SECTION     • HX OVARIAN CYSTECTOMY     • HYSTEROSCOPY ENDOMETRIAL ABLATION     • OVARIAN CYST REMOVAL     • TONSILLECTOMY                               PT Assessment/Plan     Row Name 18 1717          PT Assessment    Assessment Comments Noted increase in B cervical SB with reduced pain today after manual therapy to about 2/3 B   -PB       User Key  (r) = Recorded By, (t) = Taken By, (c) = Cosigned By    Initials Name Provider Type    PB Carola Gutiérrez, PT Physical Therapist                    Exercises     Row Name 18 1700             Subjective Comments    Subjective Comments I am able to move a little further before it pulls or stops me of the right side.   -PB         Subjective Pain    Able to rate subjective pain? yes  -PB      Pre-Treatment Pain Level 2  -PB      Post-Treatment Pain Level 1  -PB        User Key  (r) = Recorded By, (t) = Taken By, (c) = Cosigned By    Initials Name Provider Type    CAYETANO Gutiérrez, PT Physical Therapist                        Manual Rx (last 36 hours)      Manual Treatments     Row Name 18 1700             Manual Rx 1    Manual Rx 1 Location R UT   -PB      Manual Rx 1 Type Dry needling   -PB      Manual Rx 1 Grade  Several positive twitch responses   -PB      Manual Rx 1 Duration 15  -PB         Manual Rx 2    Manual Rx 2 Location R UT   -PB      Manual Rx 2 Type STM   -PB      Manual Rx 2 Grade Light to medium pressure   -PB      Manual Rx 2 Duration 15   -PB        User Key  (r) = Recorded By, (t) = Taken By, (c) = Cosigned By    Initials Name Provider Type    PB Carola Gutiérrez, PT Physical Therapist                             Time Calculation:   Start Time: 1645  Stop Time: 1715  Time Calculation (min): 30 min    Therapy Charges for Today     Code Description Service Date Service Provider Modifiers Qty    68363387406 HC PT MANUAL THERAPY EA 15 MIN 3/14/2018 Carola Gutiérrez, PT GP 2                    Carola Gutiérrez, PT  3/14/2018

## 2018-03-20 ENCOUNTER — HOSPITAL ENCOUNTER (OUTPATIENT)
Dept: PHYSICAL THERAPY | Facility: HOSPITAL | Age: 63
Setting detail: THERAPIES SERIES
Discharge: HOME | End: 2018-03-20

## 2018-03-20 DIAGNOSIS — M54.2 NECK PAIN: Primary | ICD-10-CM

## 2018-03-20 PROCEDURE — 97140 MANUAL THERAPY 1/> REGIONS: CPT | Performed by: PHYSICAL THERAPIST

## 2018-03-20 PROCEDURE — 97035 APP MDLTY 1+ULTRASOUND EA 15: CPT | Performed by: PHYSICAL THERAPIST

## 2018-03-20 NOTE — THERAPY TREATMENT NOTE
Outpatient Physical Therapy Ortho Treatment Note  [facility]     Patient Name: Ed Betancur  : 1955  MRN: [id number]  Today's Date: 3/20/2018      Visit Date: 2018    Visit Dx:    ICD-10-CM ICD-9-CM   1. Neck pain M54.2 723.1       Patient Active Problem List   Diagnosis   • Precordial pain   • Near syncope   • SVT (supraventricular tachycardia)   • MVP (mitral valve prolapse)        Past Medical History:   Diagnosis Date   • Acute bronchitis    • Chest pain    • Epigastric pain    • Fatigue    • Headache    • Health care maintenance    • Insomnia    • Muscle spasm    • Pain, upper back    • Postmenopausal    • Spasm of esophagus         Past Surgical History:   Procedure Laterality Date   • BREAST SURGERY     •  SECTION     • HX OVARIAN CYSTECTOMY     • HYSTEROSCOPY ENDOMETRIAL ABLATION     • OVARIAN CYST REMOVAL     • TONSILLECTOMY                               PT Assessment/Plan     Row Name 18 1656          PT Assessment    Assessment Comments s treatment was initially better as well.  Cervical AROM still limited most to R SB and all movement is still slow and protected   -PB       User Key  (r) = Recorded By, (t) = Taken By, (c) = Cosigned By    Initials Name Provider Type    PB Carola Gutiérrez, PT Physical Therapist                Modalities     Row Name 18 1600             Ultrasound 44449    Location L cervical / UT  -PB      Rx Minutes 8  -PB      Duty Cycle 100  -PB      Frequency 1.0 MHz  -PB      Intensity - Wts/cm 1.3  -PB        User Key  (r) = Recorded By, (t) = Taken By, (c) = Cosigned By    Initials Name Provider Type    PB Carola Gutiérrez, PT Physical Therapist                Exercises     Row Name 18 1600             Subjective Comments    Subjective Comments More sore after needling   -PB         Subjective Pain    Able to rate subjective pain? yes  -PB      Pre-Treatment Pain Level 2  -PB      Post-Treatment Pain Level 1  -PB        User Key  (r) =  Recorded By, (t) = Taken By, (c) = Cosigned By    Initials Name Provider Type    PB Carola Gutiérrez, PT Physical Therapist                        Manual Rx (last 36 hours)      Manual Treatments     Row Name 03/20/18 1500             Manual Rx 1    Manual Rx 1 Location Cervical spine   -PB      Manual Rx 1 Type Downslides and upslides B mid to lower cervical spine   -PB         Manual Rx 2    Manual Rx 2 Location Cervical spine   -PB      Manual Rx 2 Type Contract relax with L cervical rotation   -PB      Manual Rx 2 Grade isometrics x 2 sets   -PB         Manual Rx 3    Manual Rx 3 Location Thoracic spine   -PB      Manual Rx 3 Type PA to upper thoracic spine   -PB      Manual Rx 3 Grade grade III-IV  -PB      Manual Rx 3 Duration Total manual therapy 30  -PB        User Key  (r) = Recorded By, (t) = Taken By, (c) = Cosigned By    Initials Name Provider Type    PB Carola Gutiérrez PT Physical Therapist                             Time Calculation:   Start Time: 1605  Stop Time: 1650  Time Calculation (min): 45 min    Therapy Charges for Today     Code Description Service Date Service Provider Modifiers Qty    26982468913 HC PT MANUAL THERAPY EA 15 MIN 3/20/2018 Carola Gutiérrez, PT GP 2    24705852981 HC PT ULTRASOUND EA 15 MIN 3/20/2018 Carola Gutiérrez, PT GP 1                    Carola Gutiérrez, PT  3/20/2018

## 2018-03-21 ENCOUNTER — APPOINTMENT (OUTPATIENT)
Dept: PHYSICAL THERAPY | Facility: HOSPITAL | Age: 63
End: 2018-03-21

## 2018-03-27 ENCOUNTER — HOSPITAL ENCOUNTER (OUTPATIENT)
Dept: PHYSICAL THERAPY | Facility: HOSPITAL | Age: 63
Setting detail: THERAPIES SERIES
Discharge: HOME | End: 2018-03-27

## 2018-03-27 DIAGNOSIS — M54.2 NECK PAIN: Primary | ICD-10-CM

## 2018-03-27 PROCEDURE — 97140 MANUAL THERAPY 1/> REGIONS: CPT | Performed by: PHYSICAL THERAPIST

## 2018-03-27 PROCEDURE — 97035 APP MDLTY 1+ULTRASOUND EA 15: CPT | Performed by: PHYSICAL THERAPIST

## 2018-03-27 NOTE — THERAPY TREATMENT NOTE
Outpatient Physical Therapy Ortho Treatment Note  [facility]     Patient Name: Ed Betancur  : 1955  MRN: [id number]  Today's Date: 3/27/2018      Visit Date: 2018    Visit Dx:    ICD-10-CM ICD-9-CM   1. Neck pain M54.2 723.1       Patient Active Problem List   Diagnosis   • Precordial pain   • Near syncope   • SVT (supraventricular tachycardia)   • MVP (mitral valve prolapse)        Past Medical History:   Diagnosis Date   • Acute bronchitis    • Chest pain    • Epigastric pain    • Fatigue    • Headache    • Health care maintenance    • Insomnia    • Muscle spasm    • Pain, upper back    • Postmenopausal    • Spasm of esophagus         Past Surgical History:   Procedure Laterality Date   • BREAST SURGERY     •  SECTION     • HX OVARIAN CYSTECTOMY     • HYSTEROSCOPY ENDOMETRIAL ABLATION     • OVARIAN CYST REMOVAL     • TONSILLECTOMY                               PT Assessment/Plan     Row Name 18 1655          PT Assessment    Assessment Comments Cervical AROM best mobility since starting therapy.  B side bending 75% with tightness/pinching on R side with L side bending and a little painful arc moving to the R.   -PB       User Key  (r) = Recorded By, (t) = Taken By, (c) = Cosigned By    Initials Name Provider Type    PB Carola Gutiérrez, PT Physical Therapist                Modalities     Row Name 18 1600             Ultrasound 47387    Location R cervical / UT  -PB      Rx Minutes 8  -PB      Duty Cycle 100  -PB      Frequency 1.0 MHz  -PB      Intensity - Wts/cm 1.3  -PB        User Key  (r) = Recorded By, (t) = Taken By, (c) = Cosigned By    Initials Name Provider Type    PB Carola Gutiérrez, PT Physical Therapist                Exercises     Row Name 18 1600             Subjective Comments    Subjective Comments Neck does not have as much constant pain and moving better after therapy and massage therapy last week   -PB         Subjective Pain    Able to rate subjective  "pain? yes  -PB      Pre-Treatment Pain Level 1  -PB      Post-Treatment Pain Level 1  -PB         Exercise 1    Exercise Name 1 Passive stretching to R UT 30\"/2   -PB        User Key  (r) = Recorded By, (t) = Taken By, (c) = Cosigned By    Initials Name Provider Type    CAYETANO Gutiérrez, PT Physical Therapist                        Manual Rx (last 36 hours)      Manual Treatments     Row Name 03/27/18 1500             Manual Rx 1    Manual Rx 1 Location Cervical spine   -PB      Manual Rx 1 Type Downslides and upslides R mid to lower cervical spine   -PB         Manual Rx 2    Manual Rx 2 Location Cervical spine   -PB      Manual Rx 2 Type STM R lower cervical PVM  -PB         Manual Rx 3    Manual Rx 3 Location Thoracic spine   -PB      Manual Rx 3 Type PA to upper thoracic spine   -PB      Manual Rx 3 Grade grade III-IV  -PB      Manual Rx 3 Duration Total manual therapy 30  -PB        User Key  (r) = Recorded By, (t) = Taken By, (c) = Cosigned By    Initials Name Provider Type    PB Carola Gutiérrez PT Physical Therapist                             Time Calculation:   Start Time: 1607  Stop Time: 1647  Time Calculation (min): 40 min    Therapy Charges for Today     Code Description Service Date Service Provider Modifiers Qty    42082092552 HC PT ULTRASOUND EA 15 MIN 3/27/2018 Carola Gutiérrez, PT GP 1    32934077201 HC PT MANUAL THERAPY EA 15 MIN 3/27/2018 Carola Gutiérrez, PT GP 2                    Carola Gutiérrez, PT  3/27/2018     "

## 2018-04-04 ENCOUNTER — HOSPITAL ENCOUNTER (OUTPATIENT)
Dept: PHYSICAL THERAPY | Facility: HOSPITAL | Age: 63
Setting detail: THERAPIES SERIES
Discharge: HOME | End: 2018-04-04

## 2018-04-04 DIAGNOSIS — M54.2 NECK PAIN: Primary | ICD-10-CM

## 2018-04-04 PROCEDURE — 97035 APP MDLTY 1+ULTRASOUND EA 15: CPT | Performed by: PHYSICAL THERAPIST

## 2018-04-04 PROCEDURE — 97140 MANUAL THERAPY 1/> REGIONS: CPT | Performed by: PHYSICAL THERAPIST

## 2018-04-10 ENCOUNTER — HOSPITAL ENCOUNTER (OUTPATIENT)
Dept: PHYSICAL THERAPY | Facility: HOSPITAL | Age: 63
Setting detail: THERAPIES SERIES
Discharge: HOME | End: 2018-04-10

## 2018-04-10 DIAGNOSIS — M54.2 NECK PAIN: Primary | ICD-10-CM

## 2018-04-10 PROCEDURE — 97140 MANUAL THERAPY 1/> REGIONS: CPT | Performed by: PHYSICAL THERAPIST

## 2018-04-10 NOTE — THERAPY PROGRESS REPORT/RE-CERT
Outpatient Physical Therapy Ortho Re-Assessment  [facility]     Patient Name: Ed Betancur  : 1955  MRN: [id number]  Today's Date: 4/10/2018      Visit Date: 04/10/2018    Patient Active Problem List   Diagnosis   • Precordial pain   • Near syncope   • SVT (supraventricular tachycardia)   • MVP (mitral valve prolapse)        Past Medical History:   Diagnosis Date   • Acute bronchitis    • Chest pain    • Epigastric pain    • Fatigue    • Headache    • Health care maintenance    • Insomnia    • Muscle spasm    • Pain, upper back    • Postmenopausal    • Spasm of esophagus         Past Surgical History:   Procedure Laterality Date   • BREAST SURGERY     •  SECTION     • HX OVARIAN CYSTECTOMY     • HYSTEROSCOPY ENDOMETRIAL ABLATION     • OVARIAN CYST REMOVAL     • TONSILLECTOMY         Visit Dx:     ICD-10-CM ICD-9-CM   1. Neck pain M54.2 723.1                                       PT OP Goals     Row Name 04/10/18 1700          PT Short Term Goals    STG 1 Patient will be independent with initial HEP for posture and ROM/flexibility without exacerbation of symptoms.   -PB     STG 1 Progress Met  -PB     STG 2 Patient will be educated on and demonstrate knowledge of optimal posture, decompression strategies, ergonomics, good body mechanics, and proper lifting techniques to minimize stresses to spine and soft tissues with daily activities  -PB     STG 2 Progress Met  -PB     STG 3 Patient will have 75% cervical rotation with minimal to no pain for increased ease with driving  -PB     STG 3 Progress Met  -PB     STG 3 Progress Comments Near full rotation to the left, 75% to the right before pain   -PB        Long Term Goals    LTG 1 Patient will be independent with established HEP for strength/stabilization to facilitate return to desired function  -PB     LTG 1 Progress Partially Met  -PB     LTG 2 Patient will have improved postural/cervical strength for greater ease with activities such as  washing/drying hair  -PB     LTG 2 Progress Met  -PB     LTG 3 Patient will report peak pain in neck </= 2/10 with performance of daily activities.  -PB     LTG 3 Progress Met  -PB     LTG 3 Progress Comments Pain increased with stretching into painful ROM only   -PB     LTG 4 Patient will report reduced functional limitations on NDI from 24% to </= 14%  -PB     LTG 4 Progress Ongoing  -PB     LTG 5 Patient will be educated on and verbalize/demonstrate knowledge of safe/appropriate gym ex for reduced risk of symptom exacerbation with return to regular exercise routine  -PB     LTG 5 Progress Ongoing  -PB       User Key  (r) = Recorded By, (t) = Taken By, (c) = Cosigned By    Initials Name Provider Type    PB Carola Gutiérrez, PT Physical Therapist                PT Assessment/Plan     Row Name 04/10/18 9702          PT Assessment    Functional Limitations Limitations in functional capacity and performance  -PB     Impairments Pain;Joint mobility;Range of motion  -PB     Assessment Comments Mrs. Betancur has continued physical therapy weekly for treatment of right side cervical pain and stiffness.  She has received manual therapy and ultrasound working to increase painfree ROM of the cervical spine.  Cervical spine has increased rotation before pain limits on right side with right rotation but active side bending is still limited 50% with painful stretch on the right and pinching pain to the right side.  Cervical MRI does show some degenerative changes that may contribute to stiffness and pain if there is inflammation.  She is able to perform daily function without impeding on pain most of the time.   -PB     Please refer to paper survey for additional self-reported information Yes  -PB     Rehab Potential Good  -PB     Patient/caregiver participated in establishment of treatment plan and goals Yes  -PB     Patient would benefit from skilled therapy intervention Yes  -PB        PT Plan    PT Frequency 1x/week  -PB      Predicted Duration of Therapy Intervention (OT Eval) 30 days   -PB     Planned CPT's? PT MANUAL THERAPY EA 15 MIN: 56499;PT THER PROC EA 15 MIN: 86957;PT ULTRASOUND EA 15 MIN: 33134;PT TRACTION CERVICAL: 32578;PT HOT OR COLD PACK TREAT MCARE  -PB       User Key  (r) = Recorded By, (t) = Taken By, (c) = Cosigned By    Initials Name Provider Type    PB Carola Gutiérrez, PT Physical Therapist                  Exercises     Row Name 04/10/18 1700             Subjective Comments    Subjective Comments Slight improvement   -PB         Subjective Pain    Able to rate subjective pain? yes  -PB      Pre-Treatment Pain Level 1  -PB      Post-Treatment Pain Level 1  -PB      Subjective Pain Comment Sharper pain with AROM   -PB         Exercise 1    Exercise Name 1 Patient instructed to maintain performing ROM exercises without sharp pain and add using heat to neck to keep tissue relaxed and less painful after treatment.   -PB      Time 1 5  -PB        User Key  (r) = Recorded By, (t) = Taken By, (c) = Cosigned By    Initials Name Provider Type    PB Carola Gutiérrez, PT Physical Therapist           Manual Rx (last 36 hours)      Manual Treatments     Row Name 04/10/18 1700             Manual Rx 1    Manual Rx 1 Location Cervical spine   -PB      Manual Rx 1 Type Downslides and upslides R mid to lower cervical spine with cervical ROM and varied cervical flexion degree with table raised   -PB         Manual Rx 2    Manual Rx 2 Location Cervical spine   -PB      Manual Rx 2 Type STM R lower cervical PVM, UT, upper thoracic PVM  -PB         Manual Rx 3    Manual Rx 3 Location Thoracic spine   -PB      Manual Rx 3 Type PA to right upper thoracic spine   -PB      Manual Rx 3 Grade grade III-IV  -PB         Manual Rx 4    Manual Rx 4 Location R UT   -PB      Manual Rx 4 Type Dry needling to one trigger point   -PB      Manual Rx 4 Grade Positive twitch responses   -PB         Manual Rx 5    Manual Rx 5 Location Cervical spine   -PB       Manual Rx 5 Type AAROM with head on table SB and rotation with some isometric contractions  -PB      Manual Rx 5 Duration Total manual therapy 45 min  -PB        User Key  (r) = Recorded By, (t) = Taken By, (c) = Cosigned By    Initials Name Provider Type    PB Carola Gutiérrez, PT Physical Therapist                                Time Calculation:   Start Time: 1607  Stop Time: 1700  Time Calculation (min): 53 min     Therapy Charges for Today     Code Description Service Date Service Provider Modifiers Qty    [id number]  PT MANUAL THERAPY EA 15 MIN 4/10/2018 Carola Gutiérrez, PT GP 3                    Carola Gutiérrez, PT  4/10/2018

## 2018-04-18 ENCOUNTER — APPOINTMENT (OUTPATIENT)
Dept: PHYSICAL THERAPY | Facility: HOSPITAL | Age: 63
End: 2018-04-18

## 2018-05-01 ENCOUNTER — HOSPITAL ENCOUNTER (OUTPATIENT)
Dept: PHYSICAL THERAPY | Facility: HOSPITAL | Age: 63
Setting detail: THERAPIES SERIES
Discharge: HOME | End: 2018-05-01

## 2018-05-01 DIAGNOSIS — M54.2 NECK PAIN: Primary | ICD-10-CM

## 2018-05-01 PROCEDURE — 97140 MANUAL THERAPY 1/> REGIONS: CPT | Performed by: PHYSICAL THERAPIST

## 2018-05-01 NOTE — THERAPY PROGRESS REPORT/RE-CERT
Outpatient Physical Therapy Ortho Re-Assessment   [facility]     Patient Name: Ed Betancur  : 1955  MRN: [id number]  Today's Date: 2018      Visit Date: 2018    Patient Active Problem List   Diagnosis   • Precordial pain   • Near syncope   • SVT (supraventricular tachycardia)   • MVP (mitral valve prolapse)        Past Medical History:   Diagnosis Date   • Acute bronchitis    • Chest pain    • Epigastric pain    • Fatigue    • Headache    • Health care maintenance    • Insomnia    • Muscle spasm    • Pain, upper back    • Postmenopausal    • Spasm of esophagus         Past Surgical History:   Procedure Laterality Date   • BREAST SURGERY     •  SECTION     • HX OVARIAN CYSTECTOMY     • HYSTEROSCOPY ENDOMETRIAL ABLATION     • OVARIAN CYST REMOVAL     • TONSILLECTOMY         Visit Dx:     ICD-10-CM ICD-9-CM   1. Neck pain M54.2 723.1                 PT Ortho     Row Name 18 1700       Cervical Palpation    Cervical Palpation- Location? --   Trigger point R UT, tight R PVM  -PB       Cervical Accessory Motions    Cervical Accessory Motions Tested? Yes  -PB    Sideglide- C4 Right:;Hypomobile  -PB    Sideglide- C5 Right:;Hypomobile  -PB    Sideglide- C6 Right:;Hypomobile  -PB       Thoracic Accessory Motions    PA glide- T2 Right:;Left:;Hypermobile  -PB    PA glide- T3 Right:;Left:;Hypomobile  -PB    PA glide- T4 Right:;Left:;Hypomobile  -PB       Head/Neck/Trunk    Neck Flexion AROM WFL  -PB    Neck Lt Lateral Flexion AROM 50-75% with painful pinch mostly at end ROM   -PB    Neck Rt Lateral Flexion AROM 25-75% with painful pinch initially at end ROM but more easily irritated earlier in ROM   -PB    Neck Lt Rotation AROM 75% with end ROM pinch  -PB    Neck Rt Rotation AROM Closer to full ROM with painful pinch end ROM   -PB       Upper Extremity Flexibility    UE Flexibility Comments Difficulty to feel proper stretching in UT and LS due to pinch in neck without isolating past pinch    -PB      User Key  (r) = Recorded By, (t) = Taken By, (c) = Cosigned By    Initials Name Provider Type    PB Carola Gutiérrez, PT Physical Therapist                                  PT OP Goals     Row Name 05/01/18 1700          PT Short Term Goals    STG 1 Patient will be independent with initial HEP for posture and ROM/flexibility without exacerbation of symptoms.   -PB     STG 1 Progress Met  -PB     STG 2 Patient will be educated on and demonstrate knowledge of optimal posture, decompression strategies, ergonomics, good body mechanics, and proper lifting techniques to minimize stresses to spine and soft tissues with daily activities  -PB     STG 2 Progress Met  -PB     STG 3 Patient will have 75% cervical rotation with minimal to no pain for increased ease with driving  -PB     STG 3 Progress Met  -PB        Long Term Goals    LTG Date to Achieve 07/08/18  -PB     LTG 1 Patient will be independent with established HEP for strength/stabilization to facilitate return to desired function  -PB     LTG 1 Progress Partially Met  -PB     LTG 2 Patient will have improved postural/cervical strength for greater ease with activities such as washing/drying hair  -PB     LTG 2 Progress Met  -PB     LTG 3 Patient will report peak pain in neck </= 2/10 with performance of daily activities.  -PB     LTG 3 Progress Met  -PB     LTG 4 Patient will report reduced functional limitations on NDI from 24% to </= 14%  -PB     LTG 4 Progress Ongoing  -PB     LTG 5 Patient will be educated on and verbalize/demonstrate knowledge of safe/appropriate gym ex for reduced risk of symptom exacerbation with return to regular exercise routine  -PB     LTG 5 Progress Ongoing  -PB       User Key  (r) = Recorded By, (t) = Taken By, (c) = Cosigned By    Initials Name Provider Type    CAYETANO Gutiérrez, PT Physical Therapist                PT Assessment/Plan     Row Name 05/01/18 1702          PT Assessment    Functional Limitations Limitations in  functional capacity and performance  -PB     Impairments Pain;Joint mobility;Range of motion  -PB     Assessment Comments Mrs. Betancur was seen today 3 weeks since last session and next visit planned in 1 month after her vacation.  She demonstrates increased cervical AROM initial to session today with painful pinch felt mostly at the end ROM in each direction but more to the right side on the right side of the neck.  She still has cervical / thoracic stiffness and trigger point R UT limiting to painfree ROM. This pain limits her from fully turning her head such as when driving resulting in some trunk compensation. She does feel a regular low grade ache/burn and remains tender to muscle palpation.    -PB     Please refer to paper survey for additional self-reported information Yes  -PB     Rehab Potential Good  -PB     Patient/caregiver participated in establishment of treatment plan and goals Yes  -PB     Patient would benefit from skilled therapy intervention Yes  -PB        PT Plan    PT Frequency 1x/week  -PB     Predicted Duration of Therapy Intervention (OT Eval) 30 days   -PB     Planned CPT's? PT MANUAL THERAPY EA 15 MIN: 00792;PT THER PROC EA 15 MIN: 66748;PT ULTRASOUND EA 15 MIN: 31624  -PB       User Key  (r) = Recorded By, (t) = Taken By, (c) = Cosigned By    Initials Name Provider Type    PB Carola Gutiérrez, PT Physical Therapist                  Exercises     Row Name 05/01/18 1700             Subjective Comments    Subjective Comments Gradual improvement   -PB         Subjective Pain    Able to rate subjective pain? yes  -PB      Pre-Treatment Pain Level 1  -PB      Post-Treatment Pain Level 1  -PB        User Key  (r) = Recorded By, (t) = Taken By, (c) = Cosigned By    Initials Name Provider Type    CAYETANO Gutiérrez, PT Physical Therapist           Manual Rx (last 36 hours)      Manual Treatments     Row Name 05/01/18 1700             Manual Rx 1    Manual Rx 1 Location Cervical spine   -PB      Manual  Rx 1 Type Downslides and upslides R mid to lower cervical spine with cervical ROM and varied cervical flexion degree with table raised   -PB         Manual Rx 2    Manual Rx 2 Location Cervical spine   -PB      Manual Rx 2 Type STM R lower cervical PVM, UT, upper thoracic PVM  -PB         Manual Rx 3    Manual Rx 3 Location Thoracic spine   -PB      Manual Rx 3 Type PA to B upper thoracic spine   -PB      Manual Rx 3 Grade grade III-IV  -PB         Manual Rx 4    Manual Rx 4 Location R UT   -PB      Manual Rx 4 Type Dry needling to one trigger point   -PB      Manual Rx 4 Grade Positive twitch responses   -PB         Manual Rx 5    Manual Rx 5 Location Cervical spine   -PB      Manual Rx 5 Type AAROM with head on table SB and rotation with some isometric contract-relax contractions  -PB      Manual Rx 5 Duration Total manual therapy 42 min  -PB        User Key  (r) = Recorded By, (t) = Taken By, (c) = Cosigned By    Initials Name Provider Type    PB Carola Gutiérrez, PT Physical Therapist                                Time Calculation:   Start Time: 1515  Stop Time: 1600  Time Calculation (min): 45 min  Total Timed Code Minutes- PT: 42 minute(s)     Therapy Charges for Today     Code Description Service Date Service Provider Modifiers Qty    41027972193 HC PT MANUAL THERAPY EA 15 MIN 5/1/2018 Carola Gutiérrez, PT GP 3                    Carola Gutiérrez, PT  5/1/2018

## 2018-06-04 ENCOUNTER — APPOINTMENT (OUTPATIENT)
Dept: WOMENS IMAGING | Facility: HOSPITAL | Age: 63
End: 2018-06-04

## 2018-06-04 PROCEDURE — 77063 BREAST TOMOSYNTHESIS BI: CPT | Performed by: RADIOLOGY

## 2018-06-04 PROCEDURE — 77067 SCR MAMMO BI INCL CAD: CPT | Performed by: RADIOLOGY

## 2018-06-05 ENCOUNTER — APPOINTMENT (OUTPATIENT)
Dept: PHYSICAL THERAPY | Facility: HOSPITAL | Age: 63
End: 2018-06-05

## 2018-08-02 ENCOUNTER — DOCUMENTATION (OUTPATIENT)
Dept: PHYSICAL THERAPY | Facility: HOSPITAL | Age: 63
End: 2018-08-02

## 2018-08-02 NOTE — THERAPY DISCHARGE NOTE
Outpatient Physical Therapy Discharge Summary         Patient Name: Ed Betancur  : 1955  MRN: [id number]    Today's Date: 2018    Visit Dx:  No diagnosis found.          PT OP Goals     Row Name 18 1200          PT Short Term Goals    STG 1 Patient will be independent with initial HEP for posture and ROM/flexibility without exacerbation of symptoms.   -PB     STG 1 Progress Met  -PB     STG 2 Patient will be educated on and demonstrate knowledge of optimal posture, decompression strategies, ergonomics, good body mechanics, and proper lifting techniques to minimize stresses to spine and soft tissues with daily activities  -PB     STG 2 Progress Met  -PB     STG 3 Patient will have 75% cervical rotation with minimal to no pain for increased ease with driving  -PB     STG 3 Progress Met  -PB     STG 3 Progress Comments Near full rotaton to the left, 75% to the right before pain onset   -PB        Long Term Goals    LTG Date to Achieve 18  -PB     LTG 1 Patient will be independent with established HEP for strength/stabilization to facilitate return to desired function  -PB     LTG 1 Progress Partially Met  -PB     LTG 2 Patient will have improved postural/cervical strength for greater ease with activities such as washing/drying hair  -PB     LTG 2 Progress Met  -PB     LTG 3 Patient will report peak pain in neck </= 2/10 with performance of daily activities.  -PB     LTG 3 Progress Met  -PB     LTG 3 Progress Comments Pain increased stretching into painful ROM  -PB     LTG 4 Patient will report reduced functional limitations on NDI from 24% to </= 14%  -PB     LTG 4 Progress Not Met  -PB     LTG 5 Patient will be educated on and verbalize/demonstrate knowledge of safe/appropriate gym ex for reduced risk of symptom exacerbation with return to regular exercise routine  -PB     LTG 5 Progress Not Met  -PB       User Key  (r) = Recorded By, (t) = Taken By, (c) = Cosigned By    Initials Name  Provider Type    Carola Todd, PT Physical Therapist          OP PT Discharge Summary  Reason for Discharge: other (comment), Maximum functional potential achieved (Patient did not schedule further visits)  Outcomes Achieved: Patient able to partially acheive established goals  Discharge Destination: Home with home program  Discharge Instructions/Additional Comments: Mrs. Betancur was seen for 14 physical therapy sessions for cervical pain and stiffness.  She received manual therapy, ultrasound and exercise education.  She demonstrated increased cervical AROM initial to session today with painful pinch felt mostly at the end ROM in each direction but more to the right side on the right side of the neck.  She still has cervical / thoracic stiffness and trigger point R UT limiting to painfree ROM. This pain limits her from fully turning her head such as when driving resulting in some trunk compensation. She does feel a regular low grade ache/burn and remains tender to muscle palpation.  She did not return for further therapy partially meeting PT goals.       Time Calculation:        Therapy Suggested Charges     Code   Minutes Charges    None                       Carola Gutiérrez, PT  8/2/2018

## 2018-12-10 NOTE — THERAPY TREATMENT NOTE
Outpatient Physical Therapy Ortho Treatment Note  [facility]     Patient Name: Ed Betancur  : 1955  MRN: [id number]  Today's Date: 2018      Visit Date: 2018    Visit Dx:    ICD-10-CM ICD-9-CM   1. Neck pain M54.2 723.1       Patient Active Problem List   Diagnosis   • Precordial pain   • Near syncope   • SVT (supraventricular tachycardia)   • MVP (mitral valve prolapse)        Past Medical History:   Diagnosis Date   • Acute bronchitis    • Chest pain    • Epigastric pain    • Fatigue    • Headache    • Health care maintenance    • Insomnia    • Muscle spasm    • Pain, upper back    • Postmenopausal    • Spasm of esophagus         Past Surgical History:   Procedure Laterality Date   • BREAST SURGERY     •  SECTION     • HX OVARIAN CYSTECTOMY     • HYSTEROSCOPY ENDOMETRIAL ABLATION     • OVARIAN CYST REMOVAL     • TONSILLECTOMY                               PT Assessment/Plan     Row Name 18 5412          PT Assessment    Assessment Comments Cervical AROM has full rotation R, lacks about 20% rotation to L; has 75% SB to L with strong stretch on R side and 50% side bending R with initial pinch painful arc still showing some joint compression.   -PB       User Key  (r) = Recorded By, (t) = Taken By, (c) = Cosigned By    Initials Name Provider Type    PB Carola Gutiérrez, PT Physical Therapist                Modalities     Row Name 18 1700             Ultrasound 82026    Location R cervical / UT with some added cervical side bending  -PB      Rx Minutes 8  -PB      Duty Cycle 100  -PB      Frequency 1.0 MHz  -PB      Intensity - Wts/cm 1.3  -PB        User Key  (r) = Recorded By, (t) = Taken By, (c) = Cosigned By    Initials Name Provider Type    PB Carola Gutiérrez, PT Physical Therapist                Exercises     Row Name 18 1704             Subjective Comments    Subjective Comments Neck same or little better   -PB         Subjective Pain    Able to rate subjective  pain? yes  -PB      Pre-Treatment Pain Level 1  -PB      Post-Treatment Pain Level 1  -PB         Exercise 1    Exercise Name 1 Patient instructed in R UT stretch emphasis on depressing shoulder and less cervical ROM and emphasis on chin tucks to stretch R posterior cervical muscles.  -PB      Time 1 5  -PB        User Key  (r) = Recorded By, (t) = Taken By, (c) = Cosigned By    Initials Name Provider Type    PB Carola Gutiérrez PT Physical Therapist                        Manual Rx (last 36 hours)      Manual Treatments     Row Name 04/04/18 1700             Manual Rx 1    Manual Rx 1 Location Cervical spine   -PB      Manual Rx 1 Type Downslides and upslides R mid to lower cervical spine with cervical ROM   -PB         Manual Rx 2    Manual Rx 2 Location Cervical spine   -PB      Manual Rx 2 Type STM R lower cervical PVM, UT, upper thoracic PVM  -PB         Manual Rx 3    Manual Rx 3 Location Thoracic spine   -PB      Manual Rx 3 Type PA to upper thoracic spine   -PB      Manual Rx 3 Grade grade III-IV  -PB      Manual Rx 3 Duration Total manual therapy 30  -PB        User Key  (r) = Recorded By, (t) = Taken By, (c) = Cosigned By    Initials Name Provider Type    PB Carola Gutiérrez PT Physical Therapist                             Time Calculation:   Start Time: 1645  Stop Time: 1730  Time Calculation (min): 45 min    Therapy Charges for Today     Code Description Service Date Service Provider Modifiers Qty    56223792445 HC PT MANUAL THERAPY EA 15 MIN 4/4/2018 Carola Gutiérrez, PT GP 2    87551520345 HC PT ULTRASOUND EA 15 MIN 4/4/2018 Carola Gutiérrez, PT GP 1                    Carola Gutiérrez PT  4/4/2018      Pulse: 97 86 88   Resp: 18 18 18   Temp: 99.6 °F (37.6 °C)     TempSrc: Oral     SpO2: 97% 97% 97%   Weight: 182 lb (82.6 kg)     Height: 6' 2\" (1.88 m)         12:03 AM: The patient was seen and evaluated. MDM:  Patient was seen and evaluated in the ED for a fever and pain. Patient presented to the ED in no acute distress. He was afebrile on arrival. Labs were completed with no signs of infection. Patient treated with morphine for management of his pain. Dr. Eleazar Leon (Hospitalist) evaluated the patient in the ED form a palliative care standpoint and advised having the patient follow up with palliative care for further management of the pain. Patient discharged home in stable condition with instructions to follow up with palliative care. The patient was amenable with all discharge and follow up instructions. All questions were addressed and answered. Return precautions were given for new or worsening symptoms. CRITICAL CARE:   None      CONSULTS:  Dr. Denver Lima:  None      FINAL IMPRESSION      1. Cancer associated pain          DISPOSITION/PLAN   Discharge     PATIENT REFERRED TO:  [facility]  [address] [place] [phone]  Schedule an appointment as soon as possible for a visit today  RE-CHECK AND FURTHER TESTING AS NEEDED;      DISCHARGE MEDICATIONS:  New Prescriptions    No medications on file       (Please note that portionsof this note were completed with a voice recognition program.  Efforts were made to edit the dictations but occasionally words are mis-transcribed.)    Scribe: Tonya Almanza 12/10/18 12:03 AM Scribing for and in the presence of Francesca Peterson DO. Signed by: Kathy Lr,12/10/18 3:37 AM    Provider:  I personally performed the services described in the documentation,reviewed and edited the documentation which wasdictated to the scribe in my presence, and it accurately records my words and actions.     Francesca Peterson DO. 12/10/18

## 2019-06-07 ENCOUNTER — APPOINTMENT (OUTPATIENT)
Dept: WOMENS IMAGING | Facility: HOSPITAL | Age: 64
End: 2019-06-07

## 2019-06-07 PROCEDURE — 77067 SCR MAMMO BI INCL CAD: CPT | Performed by: RADIOLOGY

## 2019-06-07 PROCEDURE — 77063 BREAST TOMOSYNTHESIS BI: CPT | Performed by: RADIOLOGY

## 2020-06-12 ENCOUNTER — APPOINTMENT (OUTPATIENT)
Dept: WOMENS IMAGING | Facility: HOSPITAL | Age: 65
End: 2020-06-12

## 2020-06-12 PROCEDURE — 77063 BREAST TOMOSYNTHESIS BI: CPT | Performed by: RADIOLOGY

## 2020-06-12 PROCEDURE — 77067 SCR MAMMO BI INCL CAD: CPT | Performed by: RADIOLOGY

## 2020-08-03 ENCOUNTER — OFFICE VISIT (OUTPATIENT)
Dept: CARDIOLOGY | Facility: CLINIC | Age: 65
End: 2020-08-03

## 2020-08-03 VITALS
SYSTOLIC BLOOD PRESSURE: 112 MMHG | HEIGHT: 68 IN | HEART RATE: 85 BPM | WEIGHT: 127 LBS | DIASTOLIC BLOOD PRESSURE: 76 MMHG | OXYGEN SATURATION: 98 % | BODY MASS INDEX: 19.25 KG/M2

## 2020-08-03 DIAGNOSIS — I34.1 MITRAL VALVE PROLAPSE: ICD-10-CM

## 2020-08-03 DIAGNOSIS — I47.1 SVT (SUPRAVENTRICULAR TACHYCARDIA) (HCC): ICD-10-CM

## 2020-08-03 DIAGNOSIS — R06.09 DOE (DYSPNEA ON EXERTION): Primary | ICD-10-CM

## 2020-08-03 DIAGNOSIS — I34.0 MITRAL VALVE INSUFFICIENCY, UNSPECIFIED ETIOLOGY: ICD-10-CM

## 2020-08-03 DIAGNOSIS — R00.2 PALPITATIONS: ICD-10-CM

## 2020-08-03 PROCEDURE — 93000 ELECTROCARDIOGRAM COMPLETE: CPT | Performed by: NURSE PRACTITIONER

## 2020-08-03 PROCEDURE — 99214 OFFICE O/P EST MOD 30 MIN: CPT | Performed by: NURSE PRACTITIONER

## 2020-08-03 NOTE — PROGRESS NOTES
Date of Office Visit: 2020  Encounter Provider: Tierney Villarreal, RAZA, APRN  Place of Service: [facility] CARDIOLOGY  Patient Name: Ed Betancur  :1955        Subjective:     Chief Complaint:  Follow-up, mitral valve prolapse, palpitations      History of Present Illness:  Ed Betancur is a 65 y.o. female patient of Dr. Tinoco.  This is my first time seeing this patient in the office today and I have reviewed her records.    Patient has a history of mitral valve prolapse with regurgitation, supraventricular tachycardia, intermittent chest discomfort, near syncope felt to be vagally mediated.    Patient has a history of intermittent chest pain that has been predominantly atypical with nonspecific ST-T wave changes.  She was seen 2016 after an episode of near syncope occurred in the setting of GI distress with a history of 30 pound weight loss.  She had a 17-day monitor that showed sinus rhythm with no arrhythmia despite symptoms of dizziness.  Stress echo showed no ischemia at an excellent workload and 2 episodes of brief supraventricular tachycardia noted early in recovery.  Resting images showed normal systolic function with negative saline study and mitral valve prolapse with mild to moderate regurgitation.  Mild to moderate tricuspid regurgitation with normal RVSP noted.  Vaso-protective maneuvers were discussed as well as liberalization of salt.  Patient was seen back in the office 2017 at which point blood pressure has been running 140s over 90s in the setting of increased stress and dietary indiscretion with salt.  She is getting some occasional brief palpitations.  She is not had any recurrence of chest pain after starting Cymbalta prior to follow-up for esophageal spasm.  She has some mild dependent swelling and some shortness of breath with exertion with housework though not with walking on treadmill.  Echocardiogram 2018 showed normal LV systolic function  with EF of 62%, mild bileaflet mitral valve prolapse with mild to moderate regurgitation, trace tricuspid regurgitation with normal RVSP.  It was recommended that she follow-up with Dr. Waters in the office in 1 year with repeat echo in 2 to 3 years or sooner for issues.      Patient presents to office today for follow-up appointment.  Patient reports she is doing okay since last visit.  From a heart standpoint she is doing about the same.  She continues to have occasional palpitations lasting maybe 10 to 20 seconds at a time.  They occur maybe once a week which she feels may be more frequent than what they were in the past but they are not prolonged.  She denies associated symptoms with them.  She is drinking several caffeinated beverages a day.  She is wishing to avoid a monitor study as overall she does not feel that these are significant at this time given her adhesive tape allergy.  She continues to get some shortness of breath with exertion, none while running on the treadmill however does occur with walking up a hill or incline.  She thinks that this is probably overall about the same as last visit.  She continues to get some episodic chest discomfort episodes, thought to possibly be esophageal spasms, and these are now waking her up at night at times as well.  She has never been tested for sleep apnea.  Denies a history of snoring or morning fatigue.  Does get some occasional daytime fatigue though declined sleep apnea testing at this time.  She denies exertional chest pain/discomfort.  She continues to get some occasional lightheadedness with standing quickly, about the same as last visit.  Blood pressure and heart rate well controlled in office today.  EKG without significant changes.            Past Medical History:   Diagnosis Date   • Acute bronchitis    • Chest pain    • Epigastric pain    • Fatigue    • Headache    • Health care maintenance    • Insomnia    • Muscle spasm    • MVP (mitral valve  prolapse)    • Nonrheumatic mitral valve regurgitation    • Pain, upper back    • Postmenopausal    • Spasm of esophagus    • SVT (supraventricular tachycardia) (CMS/HCC)      Past Surgical History:   Procedure Laterality Date   • BREAST SURGERY     •  SECTION     • HX OVARIAN CYSTECTOMY     • HYSTEROSCOPY ENDOMETRIAL ABLATION     • OVARIAN CYST REMOVAL     • TONSILLECTOMY       Outpatient Medications Prior to Visit   Medication Sig Dispense Refill   • acetaminophen-codeine (TYLENOL #3) 300-30 MG per tablet Take by mouth.     • atorvastatin (LIPITOR) 10 MG tablet Take 10 mg by mouth Daily.     • DULoxetine (CYMBALTA) 60 MG capsule Take 60 mg by mouth Daily.     • estradiol (ESTRACE) 0.1 MG/GM vaginal cream Insert 2 g into the vagina Daily.     • Magnesium Oxide (MAG-200 PO) Take 300 mg by mouth Daily.     • Multiple Vitamins-Minerals (MULTIVITAMIN ADULT PO) Take  by mouth.     • Probiotic Product (PROBIOTIC DAILY PO) Take  by mouth.     • temazepam (RESTORIL) 30 MG capsule Take 30 mg by mouth At Night As Needed for Sleep.     • Nutritional Supplements (JUICE PLUS FIBRE PO) Take  by mouth.       No facility-administered medications prior to visit.        Allergies as of 2020 - Reviewed 2020   Allergen Reaction Noted   • Sulfa antibiotics Hives 2012   • Latex Rash 11/10/2016     Social History     Socioeconomic History   • Marital status:      Spouse name: Not on file   • Number of children: Not on file   • Years of education: Not on file   • Highest education level: Not on file   Tobacco Use   • Smoking status: Former Smoker     Types: Cigarettes   • Smokeless tobacco: Former User   • Tobacco comment: smoked socially during childhood   Substance and Sexual Activity   • Alcohol use: Yes     Comment: moderate alcohol use     Family History   Problem Relation Age of Onset   • Alzheimer's disease Mother    • Breast cancer Mother    • Heart disease Father    • Colon cancer Maternal  "Grandmother    • Breast cancer Paternal Grandmother    • Alzheimer's disease Paternal Grandfather        Review of Systems   Constitution: Positive for malaise/fatigue. Negative for chills, fever, weight gain and weight loss.   HENT: Negative for ear pain, hearing loss, nosebleeds and sore throat.    Eyes: Positive for double vision (with migraines). Negative for blurred vision, redness, vision loss in left eye, vision loss in right eye and visual disturbance.   Cardiovascular: Positive for dyspnea on exertion (With inclines or going up stairs).   Respiratory: Negative for cough, snoring and wheezing.    Endocrine: Positive for heat intolerance. Negative for cold intolerance.   Skin: Negative for itching, rash and suspicious lesions.   Musculoskeletal: Positive for joint swelling (\"hands\"). Negative for joint pain and myalgias.   Gastrointestinal: Negative for abdominal pain, diarrhea, hematemesis, melena, nausea and vomiting.        Hx esophageal spasms with occasional trouble swallowing   Genitourinary: Positive for decreased libido. Negative for dysuria, frequency and hematuria.   Neurological: Positive for dizziness, headaches, light-headedness and paresthesias (\"neuropathy\"). Negative for seizures.   Psychiatric/Behavioral: Negative for altered mental status and depression. The patient is not nervous/anxious.           Objective:     Vitals:    08/03/20 1051   BP: 112/76   Pulse: 85   SpO2: 98%   Weight: 57.6 kg (127 lb)   Height: 172.7 cm (68\")     Body mass index is 19.31 kg/m².      PHYSICAL EXAM:  Physical Exam   Constitutional: She is oriented to person, place, and time. She appears well-developed and well-nourished. No distress.   HENT:   Head: Normocephalic and atraumatic.   Eyes: Pupils are equal, round, and reactive to light.   Neck: Neck supple. No JVD present. Carotid bruit is not present.   Cardiovascular: Normal rate, regular rhythm, normal heart sounds and intact distal pulses. Exam reveals no " gallop and no friction rub.   No murmur heard.  Pulses:       Radial pulses are 2+ on the right side, and 2+ on the left side.        Posterior tibial pulses are 2+ on the right side, and 2+ on the left side.   Pulmonary/Chest: Effort normal and breath sounds normal. No respiratory distress. She has no wheezes. She has no rales.   Abdominal: Soft. Bowel sounds are normal. She exhibits no distension.   Musculoskeletal: She exhibits no edema, tenderness or deformity.   Neurological: She is alert and oriented to person, place, and time.   Skin: Skin is warm and dry. No rash noted. She is not diaphoretic. No erythema.   Psychiatric: She has a normal mood and affect. Her behavior is normal. Judgment normal.           ECG 12 Lead  Date/Time: 8/3/2020 11:13 AM  Performed by: Tierney Villarreal DNP, APRN  Authorized by: Tierney Villarreal DNP, WAGNER   Comparison: compared with previous ECG from 12/18/2017  Rhythm: sinus rhythm  Rate: normal  BPM: 78  Other findings: non-specific ST-T wave changes  Comments: No significant changes from previous EKG.              Assessment:       Diagnosis Plan   1. WIGGINS (dyspnea on exertion)  Adult Stress Echo W/ Cont or Stress Agent if Necessary Per Protocol   2. Mitral valve prolapse  Adult Stress Echo W/ Cont or Stress Agent if Necessary Per Protocol   3. Mitral valve insufficiency, unspecified etiology  Adult Stress Echo W/ Cont or Stress Agent if Necessary Per Protocol   4. Palpitations     5. SVT (supraventricular tachycardia) (CMS/HCC)           Plan:     1. Mild bileaflet mitral valve prolapse with regurgitation: Seen on 2018 echocardiogram.  Repeat echo in 2 to 3 years was recommended.  We will go ahead and check a repeat echo.  No significant murmurs on exam today.  2. Dyspnea on exertion: She does not get shortness of breath when she is running on the treadmill but does get quite a bit of shortness of breath with walking up a hill or going up the stairs or doing anything on an  incline.  She feels it is probably about the same as last visit.  3. Chest pain: Possibly esophageal spasm.  Symptoms did improve in the past on Cymbalta though still occurring and now also occurring at night. She has never been tested for sleep apnea and denies history of snoring or morning fatigue.  She does have some occasional daytime fatigue however she is not wanting to proceed with sleep study at this time.  She is wondering if this could be more of an anginal discomfort rather than esophageal spasm.  She does have a history of some near syncope in the past which is possibly why Imdur may have been avoided?  However no recent near syncope.  She is not wanting to alter treatment at this time.  4. Palpitations: She is drinking a couple cups of black tea as well as some soda each day.  I recommended trying to limit caffeine intake to one 8 ounce beverage a day if that.  Call if palpitations do not improve or if they become prolonged, more frequent, or with associated symptoms.  She is wishing to avoid monitor study at this time as she does have a pretty bad adhesive tape allergy though we did discuss that we could try to get some adhesive pad from monitor company.  5. Paroxysmal supraventricular tachycardia: As above.  History of near syncope felt to be vagally mediated  6. History of elevated blood pressure reading without hypertension diagnosis: Blood pressure well controlled in the office today.  7. Neuropathy: She has had multiple tests on this through primary care provider.  She is awaiting a neurology referral and is considering a [facility] evaluation as well.  We had discussed checking some arterial ultrasounds as it does not look like she has had these but she does have good pulses in her feet and declines at this time.  However she will discuss with primary care at upcoming appointment.  8. Dyslipidemia: PCP managing.  On statin.    Patient to follow-up with Dr. Tinoco in 6 months or sooner if needed for  any new, recurrent, or worsening symptoms or other issues/concerns.  If doing well at that time the move out to yearly follow-ups.           Your medication list           Accurate as of August 3, 2020 11:46 AM. If you have any questions, ask your nurse or doctor.               CONTINUE taking these medications      Instructions Last Dose Given Next Dose Due   acetaminophen-codeine 300-30 MG per tablet  Commonly known as:  TYLENOL #3      Take by mouth.       atorvastatin 10 MG tablet  Commonly known as:  LIPITOR      Take 10 mg by mouth Daily.       DULoxetine 60 MG capsule  Commonly known as:  CYMBALTA      Take 60 mg by mouth Daily.       estradiol 0.1 MG/GM vaginal cream  Commonly known as:  ESTRACE      Insert 2 g into the vagina Daily.       MAG-200 PO      Take 300 mg by mouth Daily.       MULTIVITAMIN ADULT PO      Take  by mouth.       PROBIOTIC DAILY PO      Take  by mouth.       temazepam 30 MG capsule  Commonly known as:  RESTORIL      Take 30 mg by mouth At Night As Needed for Sleep.          STOP taking these medications    JUICE PLUS FIBRE PO  Stopped by:  Tierney Villarreal DNP, APRN               I did not stop or change the above medications.  Patient's medication list was updated to reflect medications they are currently taking including medication changes made by other providers.        Thanks,    Tierney Villarreal DNP, APRN  08/03/2020         Dictated utilizing Dragon dictation

## 2020-08-26 ENCOUNTER — TELEPHONE (OUTPATIENT)
Dept: CARDIOLOGY | Facility: CLINIC | Age: 65
End: 2020-08-26

## 2020-08-26 ENCOUNTER — HOSPITAL ENCOUNTER (OUTPATIENT)
Dept: CARDIOLOGY | Facility: HOSPITAL | Age: 65
Discharge: HOME | End: 2020-08-26
Admitting: NURSE PRACTITIONER

## 2020-08-26 VITALS
HEIGHT: 68 IN | HEART RATE: 73 BPM | SYSTOLIC BLOOD PRESSURE: 110 MMHG | DIASTOLIC BLOOD PRESSURE: 80 MMHG | OXYGEN SATURATION: 100 % | BODY MASS INDEX: 19.25 KG/M2 | WEIGHT: 127 LBS

## 2020-08-26 DIAGNOSIS — I34.0 MITRAL VALVE INSUFFICIENCY, UNSPECIFIED ETIOLOGY: ICD-10-CM

## 2020-08-26 DIAGNOSIS — R06.09 DOE (DYSPNEA ON EXERTION): ICD-10-CM

## 2020-08-26 DIAGNOSIS — I34.1 MITRAL VALVE PROLAPSE: ICD-10-CM

## 2020-08-26 LAB
ASCENDING AORTA: 2.9 CM
BH CV ECHO MEAS - ACS: 1.9 CM
BH CV ECHO MEAS - AO MAX PG: 4.3 MMHG
BH CV ECHO MEAS - AO ROOT AREA (BSA CORRECTED): 1.8
BH CV ECHO MEAS - AO ROOT AREA: 7.5 CM^2
BH CV ECHO MEAS - AO ROOT DIAM: 3.1 CM
BH CV ECHO MEAS - AO V2 MAX: 104.1 CM/SEC
BH CV ECHO MEAS - ASC AORTA: 2.9 CM
BH CV ECHO MEAS - BSA(HAYCOCK): 1.7 M^2
BH CV ECHO MEAS - BSA: 1.7 M^2
BH CV ECHO MEAS - BZI_BMI: 19.3 KILOGRAMS/M^2
BH CV ECHO MEAS - BZI_METRIC_HEIGHT: 172.7 CM
BH CV ECHO MEAS - BZI_METRIC_WEIGHT: 57.6 KG
BH CV ECHO MEAS - EDV(MOD-SP2): 78 ML
BH CV ECHO MEAS - EDV(MOD-SP4): 65 ML
BH CV ECHO MEAS - EDV(TEICH): 76.4 ML
BH CV ECHO MEAS - EF(CUBED): 71.2 %
BH CV ECHO MEAS - EF(MOD-BP): 64 %
BH CV ECHO MEAS - EF(MOD-SP2): 79.5 %
BH CV ECHO MEAS - EF(MOD-SP4): 80 %
BH CV ECHO MEAS - EF(TEICH): 63.3 %
BH CV ECHO MEAS - ESV(MOD-SP2): 16 ML
BH CV ECHO MEAS - ESV(MOD-SP4): 13 ML
BH CV ECHO MEAS - ESV(TEICH): 28 ML
BH CV ECHO MEAS - FS: 34 %
BH CV ECHO MEAS - IVS/LVPW: 0.93
BH CV ECHO MEAS - IVSD: 0.94 CM
BH CV ECHO MEAS - LAT PEAK E' VEL: 9.4 CM/SEC
BH CV ECHO MEAS - LV DIASTOLIC VOL/BSA (35-75): 38.6 ML/M^2
BH CV ECHO MEAS - LV MASS(C)D: 131.3 GRAMS
BH CV ECHO MEAS - LV MASS(C)DI: 77.9 GRAMS/M^2
BH CV ECHO MEAS - LV SYSTOLIC VOL/BSA (12-30): 7.7 ML/M^2
BH CV ECHO MEAS - LVIDD: 4.1 CM
BH CV ECHO MEAS - LVIDS: 2.7 CM
BH CV ECHO MEAS - LVLD AP2: 6.4 CM
BH CV ECHO MEAS - LVLD AP4: 6.3 CM
BH CV ECHO MEAS - LVLS AP2: 5.1 CM
BH CV ECHO MEAS - LVLS AP4: 4.9 CM
BH CV ECHO MEAS - LVPWD: 1 CM
BH CV ECHO MEAS - MED PEAK E' VEL: 6.9 CM/SEC
BH CV ECHO MEAS - MV A DUR: 0.1 SEC
BH CV ECHO MEAS - MV A MAX VEL: 84.4 CM/SEC
BH CV ECHO MEAS - MV DEC SLOPE: 555.5 CM/SEC^2
BH CV ECHO MEAS - MV DEC TIME: 0.24 SEC
BH CV ECHO MEAS - MV E MAX VEL: 64.3 CM/SEC
BH CV ECHO MEAS - MV E/A: 0.76
BH CV ECHO MEAS - MV P1/2T MAX VEL: 84.2 CM/SEC
BH CV ECHO MEAS - MV P1/2T: 44.4 MSEC
BH CV ECHO MEAS - MVA P1/2T LCG: 2.6 CM^2
BH CV ECHO MEAS - MVA(P1/2T): 5 CM^2
BH CV ECHO MEAS - PULM A REVS DUR: 0.1 SEC
BH CV ECHO MEAS - PULM A REVS VEL: 30.4 CM/SEC
BH CV ECHO MEAS - PULM DIAS VEL: 46.7 CM/SEC
BH CV ECHO MEAS - PULM S/D: 1.4
BH CV ECHO MEAS - PULM SYS VEL: 65.1 CM/SEC
BH CV ECHO MEAS - RAP SYSTOLE: 3 MMHG
BH CV ECHO MEAS - RVSP: 23.2 MMHG
BH CV ECHO MEAS - SI(CUBED): 30.2 ML/M^2
BH CV ECHO MEAS - SI(MOD-SP2): 36.8 ML/M^2
BH CV ECHO MEAS - SI(MOD-SP4): 30.9 ML/M^2
BH CV ECHO MEAS - SI(TEICH): 28.7 ML/M^2
BH CV ECHO MEAS - SV(CUBED): 50.9 ML
BH CV ECHO MEAS - SV(MOD-SP2): 62 ML
BH CV ECHO MEAS - SV(MOD-SP4): 52 ML
BH CV ECHO MEAS - SV(TEICH): 48.4 ML
BH CV ECHO MEAS - TAPSE (>1.6): 1.7 CM
BH CV ECHO MEAS - TR MAX VEL: 224.7 CM/SEC
BH CV ECHO MEASUREMENTS AVERAGE E/E' RATIO: 7.89
BH CV STRESS BP STAGE 1: NORMAL
BH CV STRESS BP STAGE 2: NORMAL
BH CV STRESS BP STAGE 3: NORMAL
BH CV STRESS BP STAGE 4: 146
BH CV STRESS DURATION MIN STAGE 1: 3
BH CV STRESS DURATION MIN STAGE 2: 3
BH CV STRESS DURATION MIN STAGE 3: 3
BH CV STRESS DURATION MIN STAGE 4: 1
BH CV STRESS DURATION SEC STAGE 1: 0
BH CV STRESS DURATION SEC STAGE 2: 0
BH CV STRESS DURATION SEC STAGE 3: 0
BH CV STRESS DURATION SEC STAGE 4: 30
BH CV STRESS ECHO POST STRESS EJECTION FRACTION EF: 80 %
BH CV STRESS GRADE STAGE 1: 10
BH CV STRESS GRADE STAGE 2: 12
BH CV STRESS GRADE STAGE 3: 14
BH CV STRESS GRADE STAGE 4: 16
BH CV STRESS HR STAGE 1: 94
BH CV STRESS HR STAGE 2: 113
BH CV STRESS HR STAGE 3: 129
BH CV STRESS HR STAGE 4: 146
BH CV STRESS METS STAGE 1: 5
BH CV STRESS METS STAGE 2: 7.5
BH CV STRESS METS STAGE 3: 10
BH CV STRESS METS STAGE 4: 13.5
BH CV STRESS PROTOCOL 1: NORMAL
BH CV STRESS SPEED STAGE 1: 1.7
BH CV STRESS SPEED STAGE 2: 2.5
BH CV STRESS SPEED STAGE 3: 3.4
BH CV STRESS SPEED STAGE 4: 4.2
BH CV STRESS STAGE 1: 1
BH CV STRESS STAGE 2: 2
BH CV STRESS STAGE 3: 3
BH CV STRESS STAGE 4: 4
BH CV XLRA - RV BASE: 2.6 CM
BH CV XLRA - RV LENGTH: 6.2 CM
BH CV XLRA - RV MID: 2.2 CM
BH CV XLRA - TDI S': 9.7 CM/SEC
LEFT ATRIUM VOLUME INDEX: 24 ML/M2
MAXIMAL PREDICTED HEART RATE: 153 BPM
PERCENT MAX PREDICTED HR: 95.42 %
SINUS: 2.8 CM
STJ: 2.9 CM
STRESS BASELINE BP: NORMAL MMHG
STRESS BASELINE HR: 73 BPM
STRESS PERCENT HR: 112 %
STRESS POST ESTIMATED WORKLOAD: 12 METS
STRESS POST EXERCISE DUR MIN: 10 MIN
STRESS POST EXERCISE DUR SEC: 30 SEC
STRESS POST PEAK BP: NORMAL MMHG
STRESS POST PEAK HR: 146 BPM
STRESS TARGET HR: 130 BPM

## 2020-08-26 PROCEDURE — 25010000002 PERFLUTREN (DEFINITY) 8.476 MG IN SODIUM CHLORIDE 0.9 % 10 ML INJECTION: Performed by: NURSE PRACTITIONER

## 2020-08-26 PROCEDURE — 93017 CV STRESS TEST TRACING ONLY: CPT

## 2020-08-26 PROCEDURE — 93320 DOPPLER ECHO COMPLETE: CPT

## 2020-08-26 PROCEDURE — 93350 STRESS TTE ONLY: CPT

## 2020-08-26 PROCEDURE — 93350 STRESS TTE ONLY: CPT | Performed by: INTERNAL MEDICINE

## 2020-08-26 PROCEDURE — 93018 CV STRESS TEST I&R ONLY: CPT | Performed by: INTERNAL MEDICINE

## 2020-08-26 PROCEDURE — 93016 CV STRESS TEST SUPVJ ONLY: CPT | Performed by: INTERNAL MEDICINE

## 2020-08-26 PROCEDURE — 93352 ADMIN ECG CONTRAST AGENT: CPT | Performed by: INTERNAL MEDICINE

## 2020-08-26 PROCEDURE — 93320 DOPPLER ECHO COMPLETE: CPT | Performed by: INTERNAL MEDICINE

## 2020-08-26 PROCEDURE — 93325 DOPPLER ECHO COLOR FLOW MAPG: CPT

## 2020-08-26 PROCEDURE — 93325 DOPPLER ECHO COLOR FLOW MAPG: CPT | Performed by: INTERNAL MEDICINE

## 2020-08-26 RX ADMIN — PERFLUTREN 3 ML: 6.52 INJECTION, SUSPENSION INTRAVENOUS at 09:40

## 2020-08-26 NOTE — TELEPHONE ENCOUNTER
8/26/20  2:10  vmsg left by patient - returning your call for her tmt results. [phone]/victor hugo

## 2020-08-26 NOTE — TELEPHONE ENCOUNTER
Call patient to discuss stress echo results but she did not answer.  Left a voicemail asking her to call the office back.

## 2020-08-27 NOTE — TELEPHONE ENCOUNTER
Called and discussed stress echo results with patient.  She is not at home right now but had a couple questions about changes to her MV dec slope and a couple other readings.  I told her that I am not 100% sure what would account for these changes however her mitral regurgitation has improved and overall echo looks really good.  She is going to either call and leave a voicemail or send a my chart message with the other question she had regarding some of the measurements and I will discuss with Dr. Tinoco.  She will call and let us know if she decides to proceed with any other testing as discussed during office visit or she has any new or worsening symptoms prior to next visit.

## 2020-09-01 ENCOUNTER — TELEPHONE (OUTPATIENT)
Dept: CARDIOLOGY | Facility: CLINIC | Age: 65
End: 2020-09-01

## 2020-09-01 NOTE — TELEPHONE ENCOUNTER
Dr. Tinoco--      Please see message below.  Patient is wanting clarification on changes in the below readings.      Thanks,  Miranda Villarreal, APRN      ________________________________      MV A max taemra 53.4 (2016) to 84.4 (2020)  MV dec slope 339.5 (2016) to 155.2 (2018) to 555.5 (2020)  EF (MOD - sp 4) 59 (2016) to 80 (2020)

## 2020-09-17 NOTE — TELEPHONE ENCOUNTER
Please let the pt know these are numbers I cannot explain these numbers over the phone conversation. She can make an appointment to review this as it pertains to her. manuel

## 2020-09-18 NOTE — TELEPHONE ENCOUNTER
I sent a [employer] message back to the pt to get in for an appt so we can explain in further detail as well as show the images.      Gracie- Can you call to get this pt in?

## 2020-09-25 ENCOUNTER — OFFICE VISIT (OUTPATIENT)
Dept: CARDIOLOGY | Facility: CLINIC | Age: 65
End: 2020-09-25

## 2020-09-25 VITALS
HEIGHT: 68 IN | WEIGHT: 127 LBS | HEART RATE: 71 BPM | DIASTOLIC BLOOD PRESSURE: 78 MMHG | BODY MASS INDEX: 19.25 KG/M2 | SYSTOLIC BLOOD PRESSURE: 128 MMHG

## 2020-09-25 DIAGNOSIS — I47.1 SVT (SUPRAVENTRICULAR TACHYCARDIA) (HCC): ICD-10-CM

## 2020-09-25 DIAGNOSIS — I34.0 MITRAL VALVE INSUFFICIENCY, UNSPECIFIED ETIOLOGY: ICD-10-CM

## 2020-09-25 DIAGNOSIS — I34.1 MITRAL VALVE PROLAPSE: Primary | ICD-10-CM

## 2020-09-25 DIAGNOSIS — I34.1 MVP (MITRAL VALVE PROLAPSE): ICD-10-CM

## 2020-09-25 PROCEDURE — 99214 OFFICE O/P EST MOD 30 MIN: CPT | Performed by: INTERNAL MEDICINE

## 2020-09-25 PROCEDURE — 93000 ELECTROCARDIOGRAM COMPLETE: CPT | Performed by: INTERNAL MEDICINE

## 2020-09-25 RX ORDER — ERGOCALCIFEROL 1.25 MG/1
CAPSULE ORAL
COMMUNITY
Start: 2020-03-02

## 2020-09-25 NOTE — PROGRESS NOTES
Date of Office Visit: 2020  Encounter Provider: Petra Tinoco MD  Place of Service: [facility] CARDIOLOGY  Patient Name: Ed Betancur  :1955    Chief complaint  Mitral valve prolapse with mitral valve regurgitation and supraventricular tachycardia    History of Present Illness  The patient is a 65 year old female with history of intermittent chest pain that's been predominantly atypical and nonspecific ST-T wave changes were seen in 2016 after an episode of near syncope in the setting of GI distress and a history of 30 pound weight loss.  She had a 17 day monitor that revealed sinus rhythm with no arrhythmia despite symptoms of dizziness.  She had a stress echocardiogram that revealed no ischemia at an excellent workload with 2 episodes of brief supraventricular tachycardia noted in early recovery resting images showed normal systolic function with negative saline injection.  There was mitral valve prolapse with mild to moderate mitral regurgitation noted.  There was mild to moderate tricuspid regurgitation with normal right-sided pressures.  Vasoprotective maneuvers were discussed as well as liberalization of salt.  In 2020 she saw my APRN and complained of dyspnea on exertion and chest pain.  She had also palpitations associated with increased caffeine intake.  She declined a Holter.  A stress echocardiogram showed normal left ventricular size and systolic function and no mention of mitral prolapse was made.  There is trivial to mild mitral gravitation.  Pulmonary pressures were normal.  There was no ischemia at an excellent workload (12 METS).  Blood pressure response was normal.  When patient was contacted regarding her test results she had concerns about mitral valve a velocity, mitral valve Decel time and ejection fraction.  She is here for further clarification    Active riding horses working out 2-3 times a week with a video she has chest pain that has  chronic occurs most often at night and occasionally during the day in the next nonexertional pattern.  She has had GI evaluation felt to be due to spasm.  She has had dizziness that occurs on occasion with standing suddenly she still has palpitations that occur 10-15 times a month lasting for few seconds in a sporadic manner.  These are all chronic.    Past Medical History:   Diagnosis Date   • Acute bronchitis    • Chest pain    • Epigastric pain    • Fatigue    • Headache    • Health care maintenance    • Insomnia    • Muscle spasm    • MVP (mitral valve prolapse)    • Nonrheumatic mitral valve regurgitation    • Pain, upper back    • Postmenopausal    • Spasm of esophagus    • SVT (supraventricular tachycardia) (CMS/HCC)      Past Surgical History:   Procedure Laterality Date   • BREAST SURGERY     •  SECTION     • HX OVARIAN CYSTECTOMY     • HYSTEROSCOPY ENDOMETRIAL ABLATION     • OVARIAN CYST REMOVAL     • TONSILLECTOMY       Outpatient Medications Prior to Visit   Medication Sig Dispense Refill   • acetaminophen-codeine (TYLENOL #3) 300-30 MG per tablet Take by mouth.     • atorvastatin (LIPITOR) 10 MG tablet Take 10 mg by mouth Daily.     • DULoxetine (CYMBALTA) 60 MG capsule Take 60 mg by mouth Daily.     • ergocalciferol (ERGOCALCIFEROL) 1.25 MG (81092 UT) capsule      • estradiol (ESTRACE) 0.1 MG/GM vaginal cream Insert 2 g into the vagina Daily.     • Magnesium Oxide (MAG-200 PO) Take 300 mg by mouth Daily.     • Multiple Vitamins-Minerals (MULTIVITAMIN ADULT PO) Take  by mouth.     • Probiotic Product (PROBIOTIC DAILY PO) Take  by mouth.     • temazepam (RESTORIL) 30 MG capsule Take 30 mg by mouth At Night As Needed for Sleep.       No facility-administered medications prior to visit.        Allergies as of 2020 - Reviewed 2020   Allergen Reaction Noted   • Sulfa antibiotics Hives 2012   • Latex Rash 11/10/2016     Social History     Socioeconomic History   • Marital status:  "     Spouse name: Not on file   • Number of children: Not on file   • Years of education: Not on file   • Highest education level: Not on file   Tobacco Use   • Smoking status: Former Smoker     Types: Cigarettes   • Smokeless tobacco: Former User   • Tobacco comment: smoked socially during childhood   Substance and Sexual Activity   • Alcohol use: Yes     Comment: moderate alcohol use     Family History   Problem Relation Age of Onset   • Alzheimer's disease Mother    • Breast cancer Mother    • Heart disease Father    • Colon cancer Maternal Grandmother    • Breast cancer Paternal Grandmother    • Alzheimer's disease Paternal Grandfather      Review of Systems   Constitution: Positive for malaise/fatigue. Negative for fever, weight gain and weight loss.   HENT: Negative for ear pain, hearing loss, nosebleeds and sore throat.    Eyes: Negative for double vision, pain, vision loss in left eye and vision loss in right eye.   Cardiovascular: Positive for chest pain and palpitations.        See history of present illness.   Respiratory: Negative for cough, shortness of breath, sleep disturbances due to breathing, snoring and wheezing.    Endocrine: Positive for heat intolerance. Negative for cold intolerance and polyuria.   Skin: Negative for itching, poor wound healing and rash.   Musculoskeletal: Positive for joint pain. Negative for joint swelling and myalgias.   Gastrointestinal: Negative for abdominal pain, diarrhea, hematochezia, nausea and vomiting.   Genitourinary: Negative for hematuria and hesitancy.   Neurological: Positive for dizziness and paresthesias. Negative for numbness and seizures.   Psychiatric/Behavioral: Negative for depression. The patient is not nervous/anxious.         Objective:     Vitals:    09/25/20 0940 09/25/20 1009   BP: 142/80 128/78   BP Location: Right arm Right arm   Pulse: 71    Weight: 57.6 kg (127 lb)    Height: 172.7 cm (68\")      Body mass index is 19.31 kg/m².    Vitals " signs reviewed.   Constitutional:       Appearance: Well-developed.   Eyes:      General: No scleral icterus.        Right eye: No discharge.      Conjunctiva/sclera: Conjunctivae normal.      Pupils: Pupils are equal, round, and reactive to light.   HENT:      Head: Normocephalic.      Nose: Nose normal.   Neck:      Musculoskeletal: Normal range of motion and neck supple.      Thyroid: No thyromegaly.      Vascular: No JVD.   Pulmonary:      Effort: Pulmonary effort is normal. No respiratory distress.      Breath sounds: Normal breath sounds. No wheezing. No rales.   Cardiovascular:      Normal rate. Regular rhythm.      No gallop.   Abdominal:      General: Bowel sounds are normal. There is no distension.      Palpations: Abdomen is soft.      Tenderness: There is no abdominal tenderness. There is no rebound.   Musculoskeletal: Normal range of motion.         General: No tenderness.   Skin:     General: Skin is warm and dry.      Findings: No erythema or rash.   Neurological:      Mental Status: Alert and oriented to person, place, and time.   Psychiatric:         Behavior: Behavior normal.         Thought Content: Thought content normal.         Judgment: Judgment normal.       Lab Review:     ECG 12 Lead    Date/Time: 9/25/2020 10:10 AM  Performed by: Petra Tinoco MD  Authorized by: Petra Tinoco MD   Comparison: compared with previous ECG   Similar to previous ECG  Comparison to previous ECG: Labile ST-T wave changes similar to what she had from 18 2016.  Rhythm: sinus rhythm  Other findings: non-specific ST-T wave changes    Clinical impression: abnormal EKG          Assessment:       Diagnosis Plan   1. Mitral valve prolapse  ECG 12 Lead   2. Mitral valve insufficiency, unspecified etiology  ECG 12 Lead   3. SVT (supraventricular tachycardia) (CMS/HCC)     4. MVP (mitral valve prolapse)       Plan:       1.  Mitral valve prolapse with mitral valve gravitation.  Reviewed findings with the patient in detail  including images of prior and current echocardiogram.  In addition I discussed with her various parameters she was concerned about including post exercise ejection fraction of 80% which is normal.  Reviewed with her that the deceleration time and the velocities cannot be interpreted on their own but must be interpreted in the context of other diastolic function parameters and that her diastolic function has been normal.  She clearly has prolapse or mitral valve regurgitation has improved and is only mild.  I told her to track her blood pressure more closely but as long as no symptoms occur she would need follow-up imaging in 5 years.  We will have return for follow-up with me in 1 year  2.  Elevated blood pressure.  This was noted initially in our office visit but improved.  She will start checking this more consistently with palpitations, chest pain at night in particular.  3.  Chest pain.  This is most consistent with esophageal spasm which is what she has been told.  Would not pursue further cord investigation given the recent negative stress test and atypical features to her symptoms  4.  Elevated glucose.  I reviewed with specific recently elevated and she should follow low carbohydrate diet.  Dr. Tejeda       Your medication list          Accurate as of September 25, 2020 11:59 PM. If you have any questions, ask your nurse or doctor.            CONTINUE taking these medications      Instructions Last Dose Given Next Dose Due   acetaminophen-codeine 300-30 MG per tablet  Commonly known as: TYLENOL #3      Take by mouth.       atorvastatin 10 MG tablet  Commonly known as: LIPITOR      Take 10 mg by mouth Daily.       DULoxetine 60 MG capsule  Commonly known as: CYMBALTA      Take 60 mg by mouth Daily.       ergocalciferol 1.25 MG (44112 UT) capsule  Commonly known as: ERGOCALCIFEROL           estradiol 0.1 MG/GM vaginal cream  Commonly known as: ESTRACE      Insert 2 g into the vagina Daily.       MAG-200 PO       Take 300 mg by mouth Daily.       MULTIVITAMIN ADULT PO      Take  by mouth.       PROBIOTIC DAILY PO      Take  by mouth.       temazepam 30 MG capsule  Commonly known as: RESTORIL      Take 30 mg by mouth At Night As Needed for Sleep.              Patient is no longer taking -.  I corrected the med list to reflect this.  I did not stop these medications.    Dictated utilizing Dragon dictation

## 2020-09-27 PROBLEM — I34.0 MITRAL VALVE INSUFFICIENCY: Status: ACTIVE | Noted: 2020-09-27

## 2021-03-19 ENCOUNTER — BULK ORDERING (OUTPATIENT)
Dept: CASE MANAGEMENT | Facility: OTHER | Age: 66
End: 2021-03-19

## 2021-03-19 DIAGNOSIS — Z23 IMMUNIZATION DUE: ICD-10-CM

## 2021-06-14 ENCOUNTER — APPOINTMENT (OUTPATIENT)
Dept: WOMENS IMAGING | Facility: HOSPITAL | Age: 66
End: 2021-06-14

## 2021-06-14 PROCEDURE — 77067 SCR MAMMO BI INCL CAD: CPT | Performed by: RADIOLOGY

## 2021-06-14 PROCEDURE — 77063 BREAST TOMOSYNTHESIS BI: CPT | Performed by: RADIOLOGY

## 2021-10-06 ENCOUNTER — OFFICE VISIT (OUTPATIENT)
Dept: CARDIOLOGY | Facility: CLINIC | Age: 66
End: 2021-10-06

## 2021-10-06 VITALS
HEART RATE: 70 BPM | HEIGHT: 68 IN | DIASTOLIC BLOOD PRESSURE: 70 MMHG | WEIGHT: 128 LBS | SYSTOLIC BLOOD PRESSURE: 128 MMHG | BODY MASS INDEX: 19.4 KG/M2

## 2021-10-06 DIAGNOSIS — I34.1 MITRAL VALVE PROLAPSE: Primary | ICD-10-CM

## 2021-10-06 DIAGNOSIS — I34.0 MITRAL VALVE INSUFFICIENCY, UNSPECIFIED ETIOLOGY: ICD-10-CM

## 2021-10-06 DIAGNOSIS — I47.1 SVT (SUPRAVENTRICULAR TACHYCARDIA) (HCC): ICD-10-CM

## 2021-10-06 PROCEDURE — 93000 ELECTROCARDIOGRAM COMPLETE: CPT | Performed by: INTERNAL MEDICINE

## 2021-10-06 PROCEDURE — 99213 OFFICE O/P EST LOW 20 MIN: CPT | Performed by: INTERNAL MEDICINE

## 2021-10-06 NOTE — PROGRESS NOTES
Date of Office Visit: 10/06/2021  Encounter Provider: Petra Tinoco MD  Place of Service: [facility] CARDIOLOGY  Patient Name: Ed Betancur  :1955    Chief complaint  Mitral valve prolapse with mitral valve regurgitation and supraventricular tachycardia    History of Present Illness  The patient is a 66 year old female with history of intermittent chest pain that's been predominantly atypical and nonspecific ST-T wave changes were seen in 2016 after an episode of near syncope in the setting of GI distress and a history of 30 pound weight loss.  She had a 17 day monitor that revealed sinus rhythm with no arrhythmia despite symptoms of dizziness.  She had a stress echocardiogram that revealed no ischemia at an excellent workload with 2 episodes of brief supraventricular tachycardia noted in early recovery resting images showed normal systolic function with negative saline injection.  There was mitral valve prolapse with mild to moderate mitral regurgitation noted.  There was mild to moderate tricuspid regurgitation with normal right-sided pressures.  Vasoprotective maneuvers were discussed as well as liberalization of salt.  In 2020 complaints of chest pain, shortness of breath and palpitations.  A stress echocardiogram showed normal left ventricular size and systolic function and no mention of mitral prolapse was made.  There is trivial to mild mitral gravitation.  Pulmonary pressures were normal.  There was no ischemia at an excellent workload (12 METS).  Blood pressure response was normal.  When patient was contacted regarding her test results she had concerns about mitral valve a velocity, mitral valve Decel time and ejection fraction.  She is here for further clarification.    Since last visit she is exercising.  She has had no chest pain shortness of breath syncope near syncope or edema.  She continues to have intermittent palpitations.  She is riding her horse  every other day she is working out for 45 minutes to an hour with aerobic activities including treadmill for 3-4 times a week.  Blood work on 2021 includes normal CMP except for glucose of 103.  Normal CBC.  Blood work 2020 with an LDL of 54, HDL 74, triglycerides 39    Past Medical History:   Diagnosis Date   • Acute bronchitis    • Chest pain    • Epigastric pain    • Fatigue    • Headache    • Health care maintenance    • Insomnia    • Muscle spasm    • MVP (mitral valve prolapse)    • Nonrheumatic mitral valve regurgitation    • Pain, upper back    • Postmenopausal    • Spasm of esophagus    • SVT (supraventricular tachycardia) (HCC)      Past Surgical History:   Procedure Laterality Date   • BREAST SURGERY     •  SECTION     • HX OVARIAN CYSTECTOMY     • HYSTEROSCOPY ENDOMETRIAL ABLATION     • OVARIAN CYST REMOVAL     • TONSILLECTOMY       Outpatient Medications Prior to Visit   Medication Sig Dispense Refill   • acetaminophen-codeine (TYLENOL #3) 300-30 MG per tablet Take by mouth.     • atorvastatin (LIPITOR) 10 MG tablet Take 10 mg by mouth Daily.     • DULoxetine (CYMBALTA) 60 MG capsule Take 60 mg by mouth Daily.     • ergocalciferol (ERGOCALCIFEROL) 1.25 MG (60024 UT) capsule      • Magnesium Oxide (MAG-200 PO) Take 300 mg by mouth Daily.     • Multiple Vitamins-Minerals (MULTIVITAMIN ADULT PO) Take  by mouth.     • Probiotic Product (PROBIOTIC DAILY PO) Take  by mouth.     • temazepam (RESTORIL) 30 MG capsule Take 30 mg by mouth At Night As Needed for Sleep.     • estradiol (ESTRACE) 0.1 MG/GM vaginal cream Insert 2 g into the vagina Daily.       No facility-administered medications prior to visit.       Allergies as of 10/06/2021 - Reviewed 10/06/2021   Allergen Reaction Noted   • Sulfa antibiotics Hives 2012   • Adhesive tape Rash 10/06/2021   • Latex Rash 11/10/2016     Social History     Socioeconomic History   • Marital status:    Tobacco Use   • Smoking status: Former  "Smoker     Types: Cigarettes   • Smokeless tobacco: Former User   • Tobacco comment: smoked socially during childhood   Substance and Sexual Activity   • Alcohol use: Yes     Comment: moderate alcohol use     Family History   Problem Relation Age of Onset   • Alzheimer's disease Mother    • Breast cancer Mother    • Heart disease Father    • Colon cancer Maternal Grandmother    • Breast cancer Paternal Grandmother    • Alzheimer's disease Paternal Grandfather      Review of Systems   Constitutional: Negative for chills, fever, weight gain and weight loss.   Cardiovascular: Positive for palpitations. Negative for leg swelling.   Respiratory: Negative for cough, snoring and wheezing.    Hematologic/Lymphatic: Negative for bleeding problem. Does not bruise/bleed easily.   Skin: Negative for color change.   Musculoskeletal: Positive for joint pain and myalgias. Negative for falls.   Gastrointestinal: Negative for melena.   Genitourinary: Negative for hematuria.   Neurological: Negative for excessive daytime sleepiness.   Psychiatric/Behavioral: Negative for depression. The patient is not nervous/anxious.         Objective:     Vitals:    10/06/21 1101   BP: 128/70   Pulse: 70   Weight: 58.1 kg (128 lb)   Height: 172.7 cm (68\")     Body mass index is 19.46 kg/m².    Vitals reviewed.   Constitutional:       Appearance: Well-developed.   Eyes:      General: No scleral icterus.        Right eye: No discharge.      Conjunctiva/sclera: Conjunctivae normal.      Pupils: Pupils are equal, round, and reactive to light.   HENT:      Head: Normocephalic.      Nose: Nose normal.   Neck:      Thyroid: No thyromegaly.      Vascular: No JVD.   Pulmonary:      Effort: Pulmonary effort is normal. No respiratory distress.      Breath sounds: Normal breath sounds. No wheezing. No rales.   Cardiovascular:      Normal rate. Regular rhythm. Normal S1. Normal S2.      Murmurs: There is a grade 1/6 high frequency blowing holosystolic murmur at " the apex.      No gallop.   Pulses:     Intact distal pulses.   Edema:     Peripheral edema absent.   Abdominal:      General: Bowel sounds are normal. There is no distension.      Palpations: Abdomen is soft.      Tenderness: There is no abdominal tenderness. There is no rebound.   Musculoskeletal: Normal range of motion.         General: No tenderness.      Cervical back: Normal range of motion and neck supple. Skin:     General: Skin is warm and dry.      Findings: No erythema or rash.   Neurological:      Mental Status: Alert and oriented to person, place, and time.   Psychiatric:         Behavior: Behavior normal.         Thought Content: Thought content normal.         Judgment: Judgment normal.       Lab Review:     ECG 12 Lead    Date/Time: 10/6/2021 11:07 AM  Performed by: Petra Tinoco MD  Authorized by: Petra Tinoco MD   Comparison: compared with previous ECG   Similar to previous ECG  Rhythm: sinus rhythm  Other findings: non-specific ST-T wave changes    Clinical impression: abnormal EKG          Assessment:       Diagnosis Plan   1. Mitral valve prolapse  ECG 12 Lead   2. Mitral valve insufficiency, unspecified etiology  ECG 12 Lead   3. SVT (supraventricular tachycardia) ([facility])       Plan:       1.  Mitral valve prolapse with mitral valve regurgitation.  Echo 8/2020 with trivial to mild mitral valve regurgitation.  Clinically murmur very subtle noted on the left lateral decubitus position and remains mild.  Continue with risk factor modification follow-up in 1 year.  2.  Palpitations.  These are random occurring 3-4 times a month and unchanged in frequency.  They do seem to occur in a tandem manner and she will try to observe her inadvertent stimulant use around these times.  3.  Elevated glucose.  Followed by Dr. Tejeda.    Time Spent: I spent 20 minutes caring for Ed on this date of service. This time includes time spent by me in the following activities: preparing for the visit, reviewing tests,  obtaining and/or reviewing a separately obtained history, performing a medically appropriate examination and/or evaluation, counseling and educating the patient/family/caregiver, referring and communicating with other health care professionals and documenting information in the medical record.   I spent 1 minutes on the separately reported service of ECG. This time is not included in the time used to support the E/M service also reported today.        Your medication list          Accurate as of October 6, 2021 11:59 PM. If you have any questions, ask your nurse or doctor.            CONTINUE taking these medications      Instructions Last Dose Given Next Dose Due   acetaminophen-codeine 300-30 MG per tablet  Commonly known as: TYLENOL #3      Take by mouth.       atorvastatin 10 MG tablet  Commonly known as: LIPITOR      Take 10 mg by mouth Daily.       DULoxetine 60 MG capsule  Commonly known as: CYMBALTA      Take 60 mg by mouth Daily.       ergocalciferol 1.25 MG (80988 UT) capsule  Commonly known as: ERGOCALCIFEROL           MAG-200 PO      Take 300 mg by mouth Daily.       multivitamin with minerals tablet tablet      Take  by mouth.       PROBIOTIC DAILY PO      Take  by mouth.       temazepam 30 MG capsule  Commonly known as: RESTORIL      Take 30 mg by mouth At Night As Needed for Sleep.          STOP taking these medications    estradiol 0.1 MG/GM vaginal cream  Commonly known as: ESTRACE  Stopped by: Petra Tinoco MD             Patient is no longer taking estrace.  I corrected the med list to reflect this.  I did not stop these medications.      Dictated utilizing Dragon dictation

## 2022-06-17 ENCOUNTER — APPOINTMENT (OUTPATIENT)
Dept: WOMENS IMAGING | Facility: HOSPITAL | Age: 67
End: 2022-06-17

## 2022-06-17 PROCEDURE — 77067 SCR MAMMO BI INCL CAD: CPT | Performed by: RADIOLOGY

## 2022-06-17 PROCEDURE — 77063 BREAST TOMOSYNTHESIS BI: CPT | Performed by: RADIOLOGY

## 2022-07-06 ENCOUNTER — APPOINTMENT (OUTPATIENT)
Dept: WOMENS IMAGING | Facility: HOSPITAL | Age: 67
End: 2022-07-06

## 2022-07-06 PROCEDURE — G0279 TOMOSYNTHESIS, MAMMO: HCPCS | Performed by: RADIOLOGY

## 2022-07-06 PROCEDURE — 76642 ULTRASOUND BREAST LIMITED: CPT | Performed by: RADIOLOGY

## 2022-07-06 PROCEDURE — 77065 DX MAMMO INCL CAD UNI: CPT | Performed by: RADIOLOGY

## 2022-10-12 ENCOUNTER — OFFICE VISIT (OUTPATIENT)
Dept: CARDIOLOGY | Facility: CLINIC | Age: 67
End: 2022-10-12

## 2022-10-12 VITALS
WEIGHT: 126 LBS | HEART RATE: 71 BPM | HEIGHT: 68 IN | BODY MASS INDEX: 19.1 KG/M2 | DIASTOLIC BLOOD PRESSURE: 72 MMHG | SYSTOLIC BLOOD PRESSURE: 126 MMHG

## 2022-10-12 DIAGNOSIS — I47.1 SVT (SUPRAVENTRICULAR TACHYCARDIA): ICD-10-CM

## 2022-10-12 DIAGNOSIS — I34.1 MVP (MITRAL VALVE PROLAPSE): ICD-10-CM

## 2022-10-12 DIAGNOSIS — I34.0 MITRAL VALVE INSUFFICIENCY, UNSPECIFIED ETIOLOGY: Primary | ICD-10-CM

## 2022-10-12 DIAGNOSIS — I34.1 MITRAL VALVE PROLAPSE: ICD-10-CM

## 2022-10-12 PROCEDURE — 99213 OFFICE O/P EST LOW 20 MIN: CPT | Performed by: INTERNAL MEDICINE

## 2022-10-12 PROCEDURE — 93000 ELECTROCARDIOGRAM COMPLETE: CPT | Performed by: INTERNAL MEDICINE

## 2022-10-12 RX ORDER — TRAZODONE HYDROCHLORIDE 50 MG/1
50 TABLET ORAL DAILY
Qty: 30 TABLET | Refills: 11 | COMMUNITY
Start: 2022-03-21 | End: 2023-03-21

## 2023-07-19 ENCOUNTER — OFFICE (AMBULATORY)
Dept: URBAN - METROPOLITAN AREA CLINIC 76 | Facility: CLINIC | Age: 68
End: 2023-07-19

## 2023-07-19 VITALS
WEIGHT: 127 LBS | SYSTOLIC BLOOD PRESSURE: 126 MMHG | HEIGHT: 68 IN | HEART RATE: 82 BPM | OXYGEN SATURATION: 98 % | DIASTOLIC BLOOD PRESSURE: 80 MMHG

## 2023-07-19 DIAGNOSIS — R07.89 OTHER CHEST PAIN: ICD-10-CM

## 2023-07-19 DIAGNOSIS — K59.00 CONSTIPATION, UNSPECIFIED: ICD-10-CM

## 2023-07-19 DIAGNOSIS — Z86.010 PERSONAL HISTORY OF COLONIC POLYPS: ICD-10-CM

## 2023-07-19 PROCEDURE — 99203 OFFICE O/P NEW LOW 30 MIN: CPT | Performed by: INTERNAL MEDICINE

## 2023-08-17 VITALS
DIASTOLIC BLOOD PRESSURE: 78 MMHG | HEART RATE: 72 BPM | OXYGEN SATURATION: 97 % | DIASTOLIC BLOOD PRESSURE: 86 MMHG | RESPIRATION RATE: 14 BRPM | SYSTOLIC BLOOD PRESSURE: 126 MMHG | RESPIRATION RATE: 16 BRPM | RESPIRATION RATE: 10 BRPM | SYSTOLIC BLOOD PRESSURE: 130 MMHG | RESPIRATION RATE: 13 BRPM | OXYGEN SATURATION: 99 % | HEART RATE: 69 BPM | HEART RATE: 67 BPM | SYSTOLIC BLOOD PRESSURE: 145 MMHG | SYSTOLIC BLOOD PRESSURE: 115 MMHG | SYSTOLIC BLOOD PRESSURE: 119 MMHG | RESPIRATION RATE: 17 BRPM | SYSTOLIC BLOOD PRESSURE: 158 MMHG | WEIGHT: 127 LBS | SYSTOLIC BLOOD PRESSURE: 134 MMHG | OXYGEN SATURATION: 100 % | DIASTOLIC BLOOD PRESSURE: 83 MMHG | HEART RATE: 76 BPM | HEART RATE: 65 BPM | DIASTOLIC BLOOD PRESSURE: 72 MMHG | DIASTOLIC BLOOD PRESSURE: 87 MMHG | OXYGEN SATURATION: 98 % | HEART RATE: 75 BPM | SYSTOLIC BLOOD PRESSURE: 159 MMHG | TEMPERATURE: 96.8 F | SYSTOLIC BLOOD PRESSURE: 121 MMHG | DIASTOLIC BLOOD PRESSURE: 73 MMHG | DIASTOLIC BLOOD PRESSURE: 90 MMHG | HEART RATE: 68 BPM | RESPIRATION RATE: 15 BRPM | HEIGHT: 68 IN | HEART RATE: 77 BPM | HEART RATE: 61 BPM | RESPIRATION RATE: 9 BRPM | TEMPERATURE: 97.3 F | DIASTOLIC BLOOD PRESSURE: 88 MMHG | DIASTOLIC BLOOD PRESSURE: 76 MMHG

## 2023-08-23 ENCOUNTER — OFFICE (AMBULATORY)
Dept: URBAN - METROPOLITAN AREA PATHOLOGY 4 | Facility: PATHOLOGY | Age: 68
End: 2023-08-23

## 2023-08-23 ENCOUNTER — AMBULATORY SURGICAL CENTER (AMBULATORY)
Dept: URBAN - METROPOLITAN AREA SURGERY 17 | Facility: SURGERY | Age: 68
End: 2023-08-23

## 2023-08-23 DIAGNOSIS — Z86.010 PERSONAL HISTORY OF COLONIC POLYPS: ICD-10-CM

## 2023-08-23 DIAGNOSIS — D12.3 BENIGN NEOPLASM OF TRANSVERSE COLON: ICD-10-CM

## 2023-08-23 DIAGNOSIS — K57.30 DIVERTICULOSIS OF LARGE INTESTINE WITHOUT PERFORATION OR ABS: ICD-10-CM

## 2023-08-23 PROBLEM — K63.5 POLYP OF COLON: Status: ACTIVE | Noted: 2023-08-23

## 2023-08-23 LAB
GI HISTOLOGY: A. UNSPECIFIED: (no result)
GI HISTOLOGY: PDF REPORT: (no result)

## 2023-08-23 PROCEDURE — 45385 COLONOSCOPY W/LESION REMOVAL: CPT | Mod: PT | Performed by: INTERNAL MEDICINE

## 2023-08-23 PROCEDURE — 88305 TISSUE EXAM BY PATHOLOGIST: CPT | Performed by: INTERNAL MEDICINE

## 2023-10-20 ENCOUNTER — HOSPITAL ENCOUNTER (OUTPATIENT)
Dept: CARDIOLOGY | Facility: HOSPITAL | Age: 68
Discharge: HOME | End: 2023-10-20
Payer: MEDICARE

## 2023-10-20 ENCOUNTER — OFFICE VISIT (OUTPATIENT)
Dept: CARDIOLOGY | Facility: CLINIC | Age: 68
End: 2023-10-20
Payer: MEDICARE

## 2023-10-20 VITALS
WEIGHT: 124 LBS | HEIGHT: 68 IN | HEART RATE: 74 BPM | SYSTOLIC BLOOD PRESSURE: 108 MMHG | BODY MASS INDEX: 18.79 KG/M2 | DIASTOLIC BLOOD PRESSURE: 64 MMHG

## 2023-10-20 VITALS
DIASTOLIC BLOOD PRESSURE: 60 MMHG | HEART RATE: 80 BPM | WEIGHT: 126 LBS | HEIGHT: 68 IN | BODY MASS INDEX: 19.1 KG/M2 | SYSTOLIC BLOOD PRESSURE: 100 MMHG

## 2023-10-20 DIAGNOSIS — I34.1 MVP (MITRAL VALVE PROLAPSE): ICD-10-CM

## 2023-10-20 DIAGNOSIS — I34.0 MITRAL VALVE INSUFFICIENCY, UNSPECIFIED ETIOLOGY: ICD-10-CM

## 2023-10-20 DIAGNOSIS — I47.10 SVT (SUPRAVENTRICULAR TACHYCARDIA): Primary | ICD-10-CM

## 2023-10-20 DIAGNOSIS — I47.10 SVT (SUPRAVENTRICULAR TACHYCARDIA): ICD-10-CM

## 2023-10-20 LAB
AORTIC ARCH: 2.3 CM
ASCENDING AORTA: 2.8 CM
BH CV ECHO MEAS - ACS: 1.8 CM
BH CV ECHO MEAS - AO MAX PG: 4.2 MMHG
BH CV ECHO MEAS - AO MEAN PG: 2 MMHG
BH CV ECHO MEAS - AO ROOT DIAM: 2.8 CM
BH CV ECHO MEAS - AO V2 MAX: 102 CM/SEC
BH CV ECHO MEAS - AO V2 VTI: 19.2 CM
BH CV ECHO MEAS - AVA(I,D): 2.08 CM2
BH CV ECHO MEAS - EDV(CUBED): 85.2 ML
BH CV ECHO MEAS - EDV(MOD-SP2): 51 ML
BH CV ECHO MEAS - EDV(MOD-SP4): 42 ML
BH CV ECHO MEAS - EF(MOD-BP): 61.9 %
BH CV ECHO MEAS - EF(MOD-SP2): 64.7 %
BH CV ECHO MEAS - EF(MOD-SP4): 64.3 %
BH CV ECHO MEAS - ESV(CUBED): 16.5 ML
BH CV ECHO MEAS - ESV(MOD-SP2): 18 ML
BH CV ECHO MEAS - ESV(MOD-SP4): 15 ML
BH CV ECHO MEAS - FS: 42.2 %
BH CV ECHO MEAS - IVS/LVPW: 0.82 CM
BH CV ECHO MEAS - IVSD: 0.9 CM
BH CV ECHO MEAS - LAT PEAK E' VEL: 8.7 CM/SEC
BH CV ECHO MEAS - LV DIASTOLIC VOL/BSA (35-75): 25 CM2
BH CV ECHO MEAS - LV MASS(C)D: 147.8 GRAMS
BH CV ECHO MEAS - LV MAX PG: 3.2 MMHG
BH CV ECHO MEAS - LV MEAN PG: 2 MMHG
BH CV ECHO MEAS - LV SYSTOLIC VOL/BSA (12-30): 8.9 CM2
BH CV ECHO MEAS - LV V1 MAX: 89.5 CM/SEC
BH CV ECHO MEAS - LV V1 VTI: 16.1 CM
BH CV ECHO MEAS - LVIDD: 4.4 CM
BH CV ECHO MEAS - LVIDS: 2.5 CM
BH CV ECHO MEAS - LVOT AREA: 2.48 CM2
BH CV ECHO MEAS - LVOT DIAM: 1.78 CM
BH CV ECHO MEAS - LVPWD: 1.1 CM
BH CV ECHO MEAS - MED PEAK E' VEL: 8.2 CM/SEC
BH CV ECHO MEAS - MR MAX PG: 95.9 MMHG
BH CV ECHO MEAS - MR MAX VEL: 489.6 CM/SEC
BH CV ECHO MEAS - MV A DUR: 0.12 SEC
BH CV ECHO MEAS - MV A MAX VEL: 61.7 CM/SEC
BH CV ECHO MEAS - MV DEC SLOPE: 191.1 CM/SEC2
BH CV ECHO MEAS - MV DEC TIME: 0.21 SEC
BH CV ECHO MEAS - MV E MAX VEL: 53.5 CM/SEC
BH CV ECHO MEAS - MV E/A: 0.87
BH CV ECHO MEAS - MV MAX PG: 2.47 MMHG
BH CV ECHO MEAS - MV MEAN PG: 0.94 MMHG
BH CV ECHO MEAS - MV P1/2T: 57.8 MSEC
BH CV ECHO MEAS - MV V2 VTI: 15.6 CM
BH CV ECHO MEAS - MVA(P1/2T): 3.8 CM2
BH CV ECHO MEAS - MVA(VTI): 2.6 CM2
BH CV ECHO MEAS - PA ACC TIME: 0.09 SEC
BH CV ECHO MEAS - PA V2 MAX: 71.7 CM/SEC
BH CV ECHO MEAS - PULM A REVS DUR: 0.1 SEC
BH CV ECHO MEAS - PULM A REVS VEL: 28.4 CM/SEC
BH CV ECHO MEAS - PULM DIAS VEL: 38.4 CM/SEC
BH CV ECHO MEAS - PULM S/D: 1.68
BH CV ECHO MEAS - PULM SYS VEL: 64.4 CM/SEC
BH CV ECHO MEAS - QP/QS: 0.79
BH CV ECHO MEAS - RAP SYSTOLE: 15 MMHG
BH CV ECHO MEAS - RV MAX PG: 1.59 MMHG
BH CV ECHO MEAS - RV V1 MAX: 63 CM/SEC
BH CV ECHO MEAS - RV V1 VTI: 10.5 CM
BH CV ECHO MEAS - RVOT DIAM: 1.96 CM
BH CV ECHO MEAS - RVSP: 40 MMHG
BH CV ECHO MEAS - SI(MOD-SP2): 19.6 ML/M2
BH CV ECHO MEAS - SI(MOD-SP4): 16.1 ML/M2
BH CV ECHO MEAS - SUP REN AO DIAM: 2 CM
BH CV ECHO MEAS - SV(LVOT): 39.9 ML
BH CV ECHO MEAS - SV(MOD-SP2): 33 ML
BH CV ECHO MEAS - SV(MOD-SP4): 27 ML
BH CV ECHO MEAS - SV(RVOT): 31.7 ML
BH CV ECHO MEAS - TAPSE (>1.6): 2.08 CM
BH CV ECHO MEAS - TR MAX PG: 25.2 MMHG
BH CV ECHO MEAS - TR MAX VEL: 251.1 CM/SEC
BH CV ECHO MEAS RV FREE WALL STRAIN: -24.5 %
BH CV ECHO MEASUREMENTS AVERAGE E/E' RATIO: 6.33
BH CV XLRA - RV BASE: 3 CM
BH CV XLRA - RV LENGTH: 6.1 CM
BH CV XLRA - RV MID: 2.15 CM
BH CV XLRA - TDI S': 14.4 CM/SEC
LEFT ATRIUM VOLUME INDEX: 19.6 ML/M2
SINUS: 3.1 CM
STJ: 2.43 CM

## 2023-10-20 PROCEDURE — 93356 MYOCRD STRAIN IMG SPCKL TRCK: CPT

## 2023-10-20 PROCEDURE — 1159F MED LIST DOCD IN RCRD: CPT | Performed by: INTERNAL MEDICINE

## 2023-10-20 PROCEDURE — 99213 OFFICE O/P EST LOW 20 MIN: CPT | Performed by: INTERNAL MEDICINE

## 2023-10-20 PROCEDURE — 1160F RVW MEDS BY RX/DR IN RCRD: CPT | Performed by: INTERNAL MEDICINE

## 2023-10-20 PROCEDURE — 93306 TTE W/DOPPLER COMPLETE: CPT

## 2023-10-20 RX ORDER — TERBINAFINE HYDROCHLORIDE 250 MG/1
250 TABLET ORAL DAILY
COMMUNITY
Start: 2023-10-04

## 2023-10-20 RX ORDER — DULOXETIN HYDROCHLORIDE 30 MG/1
30 CAPSULE, DELAYED RELEASE ORAL DAILY
COMMUNITY
Start: 2023-10-12 | End: 2024-10-11

## 2023-10-20 NOTE — PROGRESS NOTES
Date of Office Visit: 10/20/2023  Encounter Provider: Petra Tinoco MD  Place of Service: [facility] CARDIOLOGY  Patient Name: Ed Betancur  :1955    Chief complaint  Mitral valve prolapse with mitral valve regurgitation and supraventricular tachycardia    History of Present Illness  The patient is a 68 year old female with history of intermittent chest pain that's been predominantly atypical and nonspecific ST-T wave changes were seen in 2016 after an episode of near syncope in the setting of GI distress and a history of 30 pound weight loss.  She had a 17 day monitor that revealed sinus rhythm with no arrhythmia despite symptoms of dizziness.  She had a stress echocardiogram that revealed no ischemia at an excellent workload with 2 episodes of brief supraventricular tachycardia noted in early recovery resting images showed normal systolic function with negative saline injection.  There was mitral valve prolapse with mild to moderate mitral regurgitation noted.  There was mild to moderate tricuspid regurgitation with normal right-sided pressures.  Vasoprotective maneuvers were discussed as well as liberalization of salt.  In 2020 complaints of chest pain, shortness of breath and palpitations.  A stress echocardiogram showed normal left ventricular size and systolic function and no mention of mitral prolapse was made.  There is trivial to mild mitral regurgitation.  Pulmonary pressures were normal.  There was no ischemia at an excellent workload (12 METS).  Blood pressure response was normal.  When patient was contacted regarding her test results she had concerns about mitral valve a velocity, mitral valve decel time and ejection fraction.  She is here for further clarification.     Since last visit she has palpitations on occasion.  She has had no shortness of breath or edema.  She states that standing she has brief twinges of chest discomfort lasting for few  seconds that is unrelated to regular exercise.  There is no pattern to it.      Past Medical History:   Diagnosis Date    Acute bronchitis     Chest pain     Epigastric pain     Fatigue     Headache     Health care maintenance     Insomnia     Muscle spasm     MVP (mitral valve prolapse)     Nonrheumatic mitral valve regurgitation     Pain, upper back     Postmenopausal     Spasm of esophagus     SVT (supraventricular tachycardia)      Past Surgical History:   Procedure Laterality Date    BREAST SURGERY       SECTION      HX OVARIAN CYSTECTOMY      HYSTEROSCOPY ENDOMETRIAL ABLATION      OVARIAN CYST REMOVAL      TONSILLECTOMY       Outpatient Medications Prior to Visit   Medication Sig Dispense Refill    atorvastatin (LIPITOR) 10 MG tablet Take 1 tablet by mouth Daily.      DULoxetine (CYMBALTA) 30 MG capsule Take 1 capsule by mouth Daily. With a 60mg tablet      DULoxetine (CYMBALTA) 60 MG capsule Take 1 capsule by mouth Daily. With a 30mg tablet      ergocalciferol (ERGOCALCIFEROL) 1.25 MG (56898 UT) capsule       Magnesium Oxide (MAG-200 PO) Take 300 mg by mouth Daily.      Multiple Vitamins-Minerals (MULTIVITAMIN ADULT PO) Take  by mouth.      Probiotic Product (PROBIOTIC DAILY PO) Take  by mouth.      temazepam (RESTORIL) 30 MG capsule Take 1 capsule by mouth At Night As Needed for Sleep.      terbinafine (lamiSIL) 250 MG tablet Take 1 tablet by mouth Daily.      traZODone (DESYREL) 50 MG tablet Take 1 tablet by mouth Daily. 30 tablet 11     No facility-administered medications prior to visit.       Allergies as of 10/20/2023 - Reviewed 10/20/2023   Allergen Reaction Noted    Sulfa antibiotics Hives 2012    Adhesive tape Rash 10/06/2021    Latex Rash 11/10/2016     Social History     Socioeconomic History    Marital status:    Tobacco Use    Smoking status: Former     Types: Cigarettes    Smokeless tobacco: Former    Tobacco comments:     smoked socially during childhood   Substance and  "Sexual Activity    Alcohol use: Yes     Comment: moderate alcohol use     Family History   Problem Relation Age of Onset    Alzheimer's disease Mother     Breast cancer Mother     Heart disease Father     Colon cancer Maternal Grandmother     Breast cancer Paternal Grandmother     Alzheimer's disease Paternal Grandfather      Review of Systems   Constitutional: Negative for chills, fever, weight gain and weight loss.   Cardiovascular:  Negative for leg swelling.   Respiratory:  Negative for cough, snoring and wheezing.    Hematologic/Lymphatic: Negative for bleeding problem. Does not bruise/bleed easily.   Skin:  Negative for color change.   Musculoskeletal:  Negative for falls, joint pain and myalgias.   Gastrointestinal:  Negative for melena.   Genitourinary:  Negative for hematuria.   Neurological:  Negative for excessive daytime sleepiness.   Psychiatric/Behavioral:  Negative for depression. The patient is not nervous/anxious.         Objective:     Vitals:    10/20/23 1105   BP: 108/64   Pulse: 74   Weight: 56.2 kg (124 lb)   Height: 172.7 cm (68\")     Body mass index is 18.85 kg/m².    Vitals reviewed.   Constitutional:       Appearance: Well-developed.   Eyes:      General: No scleral icterus.        Right eye: No discharge.      Conjunctiva/sclera: Conjunctivae normal.      Pupils: Pupils are equal, round, and reactive to light.   HENT:      Head: Normocephalic.      Nose: Nose normal.   Neck:      Thyroid: No thyromegaly.      Vascular: No JVD.   Pulmonary:      Effort: Pulmonary effort is normal. No respiratory distress.      Breath sounds: Normal breath sounds. No wheezing. No rales.   Cardiovascular:      Normal rate. Regular rhythm. Normal S1. Normal S2.       Murmurs: There is a grade 1/6 systolic murmur at the ULSB.      No gallop.    Pulses:     Intact distal pulses.      Carotid: 2+ bilaterally.     Radial: 2+ bilaterally.     Femoral: 2+ bilaterally.     Popliteal: 2+ bilaterally.     Dorsalis " pedis: 2+ bilaterally.     Posterior tibial: 2+ bilaterally.  Edema:     Peripheral edema absent.   Abdominal:      General: Bowel sounds are normal. There is no distension.      Palpations: Abdomen is soft.      Tenderness: There is no abdominal tenderness. There is no rebound.   Musculoskeletal: Normal range of motion.         General: No tenderness.      Cervical back: Normal range of motion and neck supple. Skin:     General: Skin is warm and dry.      Findings: No erythema or rash.   Neurological:      Mental Status: Alert and oriented to person, place, and time.   Psychiatric:         Behavior: Behavior normal.         Thought Content: Thought content normal.         Judgment: Judgment normal.       Lab Review:   Lab Results - Last 18 Months   Lab Units 10/12/23  1013 12/01/22  0839   WBC 10*3/uL 3.47* 4.09*   RBC 10*6/uL 4.49 4.55   HEMOGLOBIN g/dL 14.6 15.0   HEMATOCRIT % 45.6 46.1*   MCV fL 101.6* 101.3*   MCH pg 32.5 33.0   MCHC g/dL 32.0 32.5   RDW % 12.9 13.3   PLATELETS 10*3/uL 206 218   NEUTROPHIL % % 60.7 48.5   LYMPHOCYTE % % 25.4 38.1   MONOCYTES % % 10.4 9.0   EOSINOPHIL % % 2.3 3.7   BASOPHIL % % 0.9 0.7   NEUTROS ABS 10*3/uL 2.11 1.98*   LYMPHS ABS 10*3/uL 0.88 1.56   MONOS ABS 10*3/uL 0.36 0.37   EOS ABS 10*3/uL 0.08 0.15   BASOS ABS 10*3/uL 0.03 0.03   MPV fL 10.5 10.2     Blood work includes lipid panel 10/2023 with LDL 45, , triglycerides 32 SATISH cascade normal, TSH normal, CMP normal except for potassium 5.2 with normal creatinine AST 38        ECG 12 Lead    Date/Time: 10/21/2023 12:44 PM  Performed by: Petra Tinoco MD    Authorized by: Petra Tinoco MD  Comparison: compared with previous ECG   Similar to previous ECG  Rhythm: sinus rhythm  Other findings: non-specific ST-T wave changes    Clinical impression: abnormal EKG        Assessment:       Diagnosis Plan   1. SVT (supraventricular tachycardia)          Plan:       1.  Mitral valve prolapse with mitral valve regurgitation.  Echo  8/2020 with trivial to mild mitral valve regurgitation.  Clinically murmur very subtle but present and I suspect she has mild prolapse with mild regurgitation.  Clinically stable and unchanged.  We will plan on follow-up and repeat echo in a year  2.  Palpitations.  Unchanged in frequency, observe for now and she will continue to avoid stimulants  3.  Elevated glucose.  Followed by Dr. Tejeda.  4.  Hyperlipidemia, on Lipitor and followed by Dr. Tejeda.  5.  Macrocytosis followed by Dr. Tejeda.  6.  Borderline low blood pressure.  Hypotension.  She is asymptomatic.  We will monitor this closely at home and increase hydration      Time Spent: I spent 35 minutes caring for Ed on this date of service. This time includes time spent by me in the following activities: preparing for the visit, reviewing tests, obtaining and/or reviewing a separately obtained history, performing a medically appropriate examination and/or evaluation, counseling and educating the patient/family/caregiver, and ordering medications, tests, or procedures.   I spent 3 minutes on the separately reported service of ECG and Echo. This time is not included in the time used to support the E/M service also reported today.        Your medication list            Accurate as of October 20, 2023 11:59 PM. If you have any questions, ask your nurse or doctor.                CONTINUE taking these medications        Instructions Last Dose Given Next Dose Due   atorvastatin 10 MG tablet  Commonly known as: LIPITOR      Take 1 tablet by mouth Daily.       DULoxetine 60 MG capsule  Commonly known as: CYMBALTA      Take 1 capsule by mouth Daily. With a 30mg tablet       DULoxetine 30 MG capsule  Commonly known as: CYMBALTA      Take 1 capsule by mouth Daily. With a 60mg tablet       ergocalciferol 1.25 MG (06142 UT) capsule  Commonly known as: ERGOCALCIFEROL           MAG-200 PO      Take 300 mg by mouth Daily.       multivitamin with minerals tablet  tablet      Take  by mouth.       PROBIOTIC DAILY PO      Take  by mouth.       temazepam 30 MG capsule  Commonly known as: RESTORIL      Take 1 capsule by mouth At Night As Needed for Sleep.       terbinafine 250 MG tablet  Commonly known as: lamiSIL      Take 1 tablet by mouth Daily.       traZODone 50 MG tablet  Commonly known as: DESYREL      Take 1 tablet by mouth Daily.                Patient is no longer taking -.  I corrected the med list to reflect this.  I did not stop these medications.      Dictated utilizing Dragon dictation

## 2023-10-21 PROCEDURE — 93000 ELECTROCARDIOGRAM COMPLETE: CPT | Performed by: INTERNAL MEDICINE

## 2024-04-29 ENCOUNTER — TELEPHONE (OUTPATIENT)
Dept: CARDIOLOGY | Facility: CLINIC | Age: 69
End: 2024-04-29
Payer: MEDICARE

## 2024-04-29 NOTE — TELEPHONE ENCOUNTER
Pt is scheduled for TKR with Dr Bobby on 6/17/2024.      She is wanting to know if you can clear her for surgery?  I placed a clearance on your desk.

## 2024-05-13 NOTE — PROGRESS NOTES
Date of Office Visit: 2024  Encounter Provider: WAGNER Lee  Place of Service: [facility] CARDIOLOGY  Patient Name: Ed Betancur  :1955    Chief complaint  Mitral valve prolapse with mitral valve regurgitation and supraventricular tachycardia     History of Present Illness  Patient is a 69 y.o. year old female  patient of Dr. Tinoco. Past medical history includes  intermittent chest pain that's been predominantly atypical and nonspecific ST-T wave changes were seen in 2016 after an episode of near syncope in the setting of GI distress and a history of 30 pound weight loss.  She had a 17 day monitor that revealed sinus rhythm with no arrhythmia despite symptoms of dizziness.  She had a stress echocardiogram that revealed no ischemia at an excellent workload with 2 episodes of brief supraventricular tachycardia noted in early recovery resting images showed normal systolic function with negative saline injection.  There was mitral valve prolapse with mild to moderate mitral regurgitation noted.  There was mild to moderate tricuspid regurgitation with normal right-sided pressures.  Vasoprotective maneuvers were discussed as well as liberalization of salt.  In 2020 complaints of chest pain, shortness of breath and palpitations.  A stress echocardiogram showed normal left ventricular size and systolic function and no mention of mitral prolapse was made.  There is trivial to mild mitral regurgitation.  Pulmonary pressures were normal.  There was no ischemia at an excellent workload (12 METS).  Blood pressure response was normal.      Interval history  Patient presents today for routine follow-up and cardiac clearance prior to total knee replacement with Dr. Bobby that is currently scheduled for 2024.  Since last visit she has been doing well.  She denies palpitations, shortness of breath, edema, dizziness, chest pain or chest pressure, fatigue,  syncope or presyncope.  Blood pressure today is at goal and she reports similar readings at home.  She is very active exercising daily and also doing physical therapy in preparation for surgery with no exertional symptoms.    Past Medical History:   Diagnosis Date    Acute bronchitis     Chest pain     Epigastric pain     Fatigue     Headache     Health care maintenance     Insomnia     Muscle spasm     MVP (mitral valve prolapse)     Nonrheumatic mitral valve regurgitation     Pain, upper back     Postmenopausal     Spasm of esophagus     SVT (supraventricular tachycardia)      Past Surgical History:   Procedure Laterality Date    BREAST SURGERY       SECTION      HX OVARIAN CYSTECTOMY      HYSTEROSCOPY ENDOMETRIAL ABLATION      OVARIAN CYST REMOVAL      TONSILLECTOMY       Outpatient Medications Prior to Visit   Medication Sig Dispense Refill    atorvastatin (LIPITOR) 10 MG tablet Take 1 tablet by mouth Daily.      DULoxetine (CYMBALTA) 30 MG capsule Take 1 capsule by mouth Daily. With a 60mg tablet      DULoxetine (CYMBALTA) 60 MG capsule Take 1 capsule by mouth Daily. With a 30mg tablet      ergocalciferol (ERGOCALCIFEROL) 1.25 MG (09276 UT) capsule       Magnesium Oxide (MAG-200 PO) Take 300 mg by mouth Daily.      Multiple Vitamins-Minerals (MULTIVITAMIN ADULT PO) Take  by mouth.      Probiotic Product (PROBIOTIC DAILY PO) Take  by mouth.      temazepam (RESTORIL) 30 MG capsule Take 1 capsule by mouth At Night As Needed for Sleep.      traZODone (DESYREL) 50 MG tablet Take 1 tablet by mouth Daily. 30 tablet 11    terbinafine (lamiSIL) 250 MG tablet Take 1 tablet by mouth Daily. (Patient not taking: Reported on 2024)       No facility-administered medications prior to visit.       Allergies as of 2024 - Reviewed 2024   Allergen Reaction Noted    Sulfa antibiotics Hives 2012    Adhesive tape Rash 10/06/2021    Latex Rash 11/10/2016     Social History     Socioeconomic History     "Marital status:    Tobacco Use    Smoking status: Former     Types: Cigarettes     Passive exposure: Past    Smokeless tobacco: Former    Tobacco comments:     smoked socially during childhood   Vaping Use    Vaping status: Never Used   Substance and Sexual Activity    Alcohol use: Yes     Comment: moderate alcohol use    Drug use: Never    Sexual activity: Defer     Family History   Problem Relation Age of Onset    Alzheimer's disease Mother     Breast cancer Mother     Heart disease Father     Colon cancer Maternal Grandmother     Breast cancer Paternal Grandmother     Alzheimer's disease Paternal Grandfather      Review of Systems   Constitutional: Negative for malaise/fatigue.   Cardiovascular:  Negative for chest pain, claudication, dyspnea on exertion, leg swelling, near-syncope, orthopnea, palpitations, paroxysmal nocturnal dyspnea and syncope.   Respiratory:  Negative for shortness of breath.    Neurological:  Negative for brief paralysis, dizziness, headaches and light-headedness.   All other systems reviewed and are negative.       Objective:     Vitals:    05/14/24 0813   BP: 116/78   BP Location: Right arm   Patient Position: Sitting   Cuff Size: Adult   Pulse: 66   Resp: 16   SpO2: 99%   Weight: 56.7 kg (125 lb)   Height: 172.7 cm (68\")     Body mass index is 19.01 kg/m².    Vitals reviewed.   Constitutional:       General: Not in acute distress.     Appearance: Well-developed and not in distress. Not diaphoretic.   HENT:      Head: Normocephalic.   Pulmonary:      Effort: Pulmonary effort is normal. No respiratory distress.      Breath sounds: Normal breath sounds. No wheezing. No rhonchi. No rales.   Cardiovascular:      Normal rate. Regular rhythm.      Murmurs: There is a grade 1/6 systolic murmur.   Pulses:     Radial: 2+ bilaterally.  Edema:     Peripheral edema absent.   Skin:     General: Skin is warm and dry. There is no cyanosis.      Findings: No rash.   Neurological:      Mental " "Status: Alert and oriented to person, place, and time.   Psychiatric:         Behavior: Behavior normal. Behavior is cooperative.         Thought Content: Thought content normal.         Judgment: Judgment normal.       Lab Review:     Lab Results   Component Value Date     (L) 12/01/2022     07/25/2022    K 5.0 12/01/2022    K 4.4 07/25/2022    CL 99 12/01/2022     07/25/2022    CO2 28 12/01/2022    CO2 29 07/25/2022    BUN 22 (H) 12/01/2022    BUN 12 07/25/2022    CREATININE 0.67 12/01/2022    CREATININE 0.68 07/25/2022    EGFRIFNONA 58 03/31/2015    EGFRIFAFRI >60 12/01/2022    EGFRIFAFRI >60 07/25/2022    GLUCOSE 91 03/31/2015    CALCIUM 8.9 12/01/2022    CALCIUM 9.4 07/25/2022    PROTENTOTREF 6.7 03/31/2015    ALBUMIN 4.6 12/01/2022    ALBUMIN 4.5 07/25/2022    BILITOT 0.3 12/01/2022    BILITOT 0.4 07/25/2022    AST 37 (H) 12/01/2022    AST 30 07/25/2022    ALT 30 12/01/2022    ALT 24 07/25/2022     Lab Results   Component Value Date    WBC 4.27 (L) 05/10/2024    WBC 3.47 (L) 10/12/2023    HGB 14.5 05/10/2024    HGB 14.6 10/12/2023    HCT 44.7 05/10/2024    HCT 45.6 10/12/2023    .0 05/10/2024    .6 (H) 10/12/2023     05/10/2024     10/12/2023     No results found for: \"PROBNP\", \"BNP\"  Lab Results   Component Value Date    CKTOTAL 72 03/31/2015     Lab Results   Component Value Date    TSH 1.700 02/17/2020    TSH 1.91 03/31/2015             ECG 12 Lead    Date/Time: 5/14/2024 8:36 AM  Performed by: Olga Chaves APRN    Authorized by: Olga Chaves, WAGNER  Comparison: compared with previous ECG   Similar to previous ECG  Rhythm: sinus rhythm  Rate: normal  BPM: 66  QRS axis: normal  Other findings: T wave abnormality  Comments: Similar to prior        Assessment:       Diagnosis Plan   1. SVT (supraventricular tachycardia)        2. Mitral valve insufficiency, unspecified etiology        3. MVP (mitral valve prolapse)        4. Mixed hyperlipidemia      "     Plan:       1.  Mitral valve prolapse with mitral valve regurgitation.  Echo 8/2020 with trivial to mild mitral valve regurgitation.  Clinically murmur very subtle but present and I suspect she has mild prolapse with mild regurgitation.  Clinically stable and unchanged.  Consider repeat echocardiogram at appointment in the fall.    2.  Palpitations.  She currently denies palpitations.  Will continue to observe.  She will continue to avoid stimulants such as caffeine, alcohol, chocolate and stimulant medications such as decongestants and nasal sprays.  3.  Elevated glucose.  Followed by Dr. Tejeda.  4.  Hyperlipidemia, on Lipitor and followed by Dr. Tejeda.  5.  Macrocytosis followed by Dr. Tejeda.  6.  Borderline low blood pressure.  Reminded her to monitor for hypotension with coming summer months.  Blood pressure today is reasonable.  Reminded her to stay hydrated especially in the summer heat.  7.  Preoperative clearance.  She had normal stress test in the past.  Echo with only mild mitral regurgitation.  No new symptoms.  Would consider her average risk for this elective procedure.  She is cleared from a cardiac standpoint to proceed with scheduled surgery on June 17 with Dr. Bobby.      Time Spent: I spent 30 minutes caring for Ed on this date of service. This time includes time spent by me in the following activities: preparing for the visit, reviewing tests, performing a medically appropriate examination and/or evaluations, counseling and educating the patient/family/caregiver, ordering medications, tests, or procedures, documenting information in the medical record, and independently interpreting results and communicating that information with the patient/family/caregiver.   I spent 1 minutes on the separately reported service of ECG. This time is not included in the time used to support the E/M service also reported today.        Your medication list            Accurate as of May 14, 2024  8:41  AM. If you have any questions, ask your nurse or doctor.                CONTINUE taking these medications        Instructions Last Dose Given Next Dose Due   atorvastatin 10 MG tablet  Commonly known as: LIPITOR      Take 1 tablet by mouth Daily.       DULoxetine 60 MG capsule  Commonly known as: CYMBALTA      Take 1 capsule by mouth Daily. With a 30mg tablet       DULoxetine 30 MG capsule  Commonly known as: CYMBALTA      Take 1 capsule by mouth Daily. With a 60mg tablet       ergocalciferol 1.25 MG (76082 UT) capsule  Commonly known as: ERGOCALCIFEROL           MAG-200 PO      Take 300 mg by mouth Daily.       multivitamin with minerals tablet tablet      Take  by mouth.       PROBIOTIC DAILY PO      Take  by mouth.       temazepam 30 MG capsule  Commonly known as: RESTORIL      Take 1 capsule by mouth At Night As Needed for Sleep.       traZODone 50 MG tablet  Commonly known as: DESYREL      Take 1 tablet by mouth Daily.              STOP taking these medications      terbinafine 250 MG tablet  Commonly known as: lamiSIL  Stopped by: WAGNER Lee                 Patient is no longer taking -.  I corrected the med list to reflect this.  I did not stop these medications.    Return for Keep fall appointment with Dr. Tinoco.      Dictated utilizing Dragon dictation

## 2024-05-14 ENCOUNTER — OFFICE VISIT (OUTPATIENT)
Dept: CARDIOLOGY | Facility: CLINIC | Age: 69
End: 2024-05-14
Payer: MEDICARE

## 2024-05-14 VITALS
BODY MASS INDEX: 18.94 KG/M2 | OXYGEN SATURATION: 99 % | SYSTOLIC BLOOD PRESSURE: 116 MMHG | HEIGHT: 68 IN | RESPIRATION RATE: 16 BRPM | HEART RATE: 66 BPM | WEIGHT: 125 LBS | DIASTOLIC BLOOD PRESSURE: 78 MMHG

## 2024-05-14 DIAGNOSIS — E78.2 MIXED HYPERLIPIDEMIA: ICD-10-CM

## 2024-05-14 DIAGNOSIS — I34.1 MVP (MITRAL VALVE PROLAPSE): ICD-10-CM

## 2024-05-14 DIAGNOSIS — I34.0 MITRAL VALVE INSUFFICIENCY, UNSPECIFIED ETIOLOGY: ICD-10-CM

## 2024-05-14 DIAGNOSIS — I47.10 SVT (SUPRAVENTRICULAR TACHYCARDIA): Primary | ICD-10-CM

## 2024-05-14 PROCEDURE — 99214 OFFICE O/P EST MOD 30 MIN: CPT | Performed by: NURSE PRACTITIONER

## 2024-05-14 PROCEDURE — 93000 ELECTROCARDIOGRAM COMPLETE: CPT | Performed by: NURSE PRACTITIONER

## 2024-05-14 NOTE — LETTER
May 14, 2024     Hima Bobby MD  [address]  [address]  [place] [id number]    Patient: Ed Betancur   YOB: 1955   Date of Visit: 2024     Dear Hima Bobby MD:       Thank you for referring Ed Betancur to me for evaluation. Below are the relevant portions of my assessment and plan of care.    If you have questions, please do not hesitate to call me. I look forward to following Ed along with you.         Sincerely,        WAGNER Lee        CC: No Recipients    Olga Chaves APRN  [phone]  Sign when Signing Visit  Date of Office Visit: 2024  Encounter Provider: WAGNER Lee  Place of Service: [facility] CARDIOLOGY  Patient Name: Ed Betancur  :1955    Chief complaint  Mitral valve prolapse with mitral valve regurgitation and supraventricular tachycardia     History of Present Illness  Patient is a 69 y.o. year old female  patient of Dr. Tinoco. Past medical history includes  intermittent chest pain that's been predominantly atypical and nonspecific ST-T wave changes were seen in 2016 after an episode of near syncope in the setting of GI distress and a history of 30 pound weight loss.  She had a 17 day monitor that revealed sinus rhythm with no arrhythmia despite symptoms of dizziness.  She had a stress echocardiogram that revealed no ischemia at an excellent workload with 2 episodes of brief supraventricular tachycardia noted in early recovery resting images showed normal systolic function with negative saline injection.  There was mitral valve prolapse with mild to moderate mitral regurgitation noted.  There was mild to moderate tricuspid regurgitation with normal right-sided pressures.  Vasoprotective maneuvers were discussed as well as liberalization of salt.  In 2020 complaints of chest pain, shortness of breath and palpitations.  A stress echocardiogram showed normal left  ventricular size and systolic function and no mention of mitral prolapse was made.  There is trivial to mild mitral regurgitation.  Pulmonary pressures were normal.  There was no ischemia at an excellent workload (12 METS).  Blood pressure response was normal.      Interval history  Patient presents today for routine follow-up and cardiac clearance prior to total knee replacement with Dr. Bobby that is currently scheduled for 2024.  Since last visit she has been doing well.  She denies palpitations, shortness of breath, edema, dizziness, chest pain or chest pressure, fatigue, syncope or presyncope.  Blood pressure today is at goal and she reports similar readings at home.  She is very active exercising daily and also doing physical therapy in preparation for surgery with no exertional symptoms.    Past Medical History:   Diagnosis Date   • Acute bronchitis    • Chest pain    • Epigastric pain    • Fatigue    • Headache    • Health care maintenance    • Insomnia    • Muscle spasm    • MVP (mitral valve prolapse)    • Nonrheumatic mitral valve regurgitation    • Pain, upper back    • Postmenopausal    • Spasm of esophagus    • SVT (supraventricular tachycardia)      Past Surgical History:   Procedure Laterality Date   • BREAST SURGERY     •  SECTION     • HX OVARIAN CYSTECTOMY     • HYSTEROSCOPY ENDOMETRIAL ABLATION     • OVARIAN CYST REMOVAL     • TONSILLECTOMY       Outpatient Medications Prior to Visit   Medication Sig Dispense Refill   • atorvastatin (LIPITOR) 10 MG tablet Take 1 tablet by mouth Daily.     • DULoxetine (CYMBALTA) 30 MG capsule Take 1 capsule by mouth Daily. With a 60mg tablet     • DULoxetine (CYMBALTA) 60 MG capsule Take 1 capsule by mouth Daily. With a 30mg tablet     • ergocalciferol (ERGOCALCIFEROL) 1.25 MG (87698 UT) capsule      • Magnesium Oxide (MAG-200 PO) Take 300 mg by mouth Daily.     • Multiple Vitamins-Minerals (MULTIVITAMIN ADULT PO) Take  by mouth.     • Probiotic  "Product (PROBIOTIC DAILY PO) Take  by mouth.     • temazepam (RESTORIL) 30 MG capsule Take 1 capsule by mouth At Night As Needed for Sleep.     • traZODone (DESYREL) 50 MG tablet Take 1 tablet by mouth Daily. 30 tablet 11   • terbinafine (lamiSIL) 250 MG tablet Take 1 tablet by mouth Daily. (Patient not taking: Reported on 5/14/2024)       No facility-administered medications prior to visit.       Allergies as of 05/14/2024 - Reviewed 05/14/2024   Allergen Reaction Noted   • Sulfa antibiotics Hives 06/25/2012   • Adhesive tape Rash 10/06/2021   • Latex Rash 11/10/2016     Social History     Socioeconomic History   • Marital status:    Tobacco Use   • Smoking status: Former     Types: Cigarettes     Passive exposure: Past   • Smokeless tobacco: Former   • Tobacco comments:     smoked socially during childhood   Vaping Use   • Vaping status: Never Used   Substance and Sexual Activity   • Alcohol use: Yes     Comment: moderate alcohol use   • Drug use: Never   • Sexual activity: Defer     Family History   Problem Relation Age of Onset   • Alzheimer's disease Mother    • Breast cancer Mother    • Heart disease Father    • Colon cancer Maternal Grandmother    • Breast cancer Paternal Grandmother    • Alzheimer's disease Paternal Grandfather      Review of Systems   Constitutional: Negative for malaise/fatigue.   Cardiovascular:  Negative for chest pain, claudication, dyspnea on exertion, leg swelling, near-syncope, orthopnea, palpitations, paroxysmal nocturnal dyspnea and syncope.   Respiratory:  Negative for shortness of breath.    Neurological:  Negative for brief paralysis, dizziness, headaches and light-headedness.   All other systems reviewed and are negative.       Objective:     Vitals:    05/14/24 0813   BP: 116/78   BP Location: Right arm   Patient Position: Sitting   Cuff Size: Adult   Pulse: 66   Resp: 16   SpO2: 99%   Weight: 56.7 kg (125 lb)   Height: 172.7 cm (68\")     Body mass index is 19.01 " "kg/m².    Vitals reviewed.   Constitutional:       General: Not in acute distress.     Appearance: Well-developed and not in distress. Not diaphoretic.   HENT:      Head: Normocephalic.   Pulmonary:      Effort: Pulmonary effort is normal. No respiratory distress.      Breath sounds: Normal breath sounds. No wheezing. No rhonchi. No rales.   Cardiovascular:      Normal rate. Regular rhythm.      Murmurs: There is a grade 1/6 systolic murmur.   Pulses:     Radial: 2+ bilaterally.  Edema:     Peripheral edema absent.   Skin:     General: Skin is warm and dry. There is no cyanosis.      Findings: No rash.   Neurological:      Mental Status: Alert and oriented to person, place, and time.   Psychiatric:         Behavior: Behavior normal. Behavior is cooperative.         Thought Content: Thought content normal.         Judgment: Judgment normal.       Lab Review:     Lab Results   Component Value Date     (L) 12/01/2022     07/25/2022    K 5.0 12/01/2022    K 4.4 07/25/2022    CL 99 12/01/2022     07/25/2022    CO2 28 12/01/2022    CO2 29 07/25/2022    BUN 22 (H) 12/01/2022    BUN 12 07/25/2022    CREATININE 0.67 12/01/2022    CREATININE 0.68 07/25/2022    EGFRIFNONA 58 03/31/2015    EGFRIFAFRI >60 12/01/2022    EGFRIFAFRI >60 07/25/2022    GLUCOSE 91 03/31/2015    CALCIUM 8.9 12/01/2022    CALCIUM 9.4 07/25/2022    PROTENTOTREF 6.7 03/31/2015    ALBUMIN 4.6 12/01/2022    ALBUMIN 4.5 07/25/2022    BILITOT 0.3 12/01/2022    BILITOT 0.4 07/25/2022    AST 37 (H) 12/01/2022    AST 30 07/25/2022    ALT 30 12/01/2022    ALT 24 07/25/2022     Lab Results   Component Value Date    WBC 4.27 (L) 05/10/2024    WBC 3.47 (L) 10/12/2023    HGB 14.5 05/10/2024    HGB 14.6 10/12/2023    HCT 44.7 05/10/2024    HCT 45.6 10/12/2023    .0 05/10/2024    .6 (H) 10/12/2023     05/10/2024     10/12/2023     No results found for: \"PROBNP\", \"BNP\"  Lab Results   Component Value Date    CKTOTAL 72 " 03/31/2015     Lab Results   Component Value Date    TSH 1.700 02/17/2020    TSH 1.91 03/31/2015             ECG 12 Lead    Date/Time: 5/14/2024 8:36 AM  Performed by: Olga Chaves APRN    Authorized by: Olga Chaves APRN  Comparison: compared with previous ECG   Similar to previous ECG  Rhythm: sinus rhythm  Rate: normal  BPM: 66  QRS axis: normal  Other findings: T wave abnormality  Comments: Similar to prior        Assessment:       Diagnosis Plan   1. SVT (supraventricular tachycardia)        2. Mitral valve insufficiency, unspecified etiology        3. MVP (mitral valve prolapse)        4. Mixed hyperlipidemia          Plan:       1.  Mitral valve prolapse with mitral valve regurgitation.  Echo 8/2020 with trivial to mild mitral valve regurgitation.  Clinically murmur very subtle but present and I suspect she has mild prolapse with mild regurgitation.  Clinically stable and unchanged.  Consider repeat echocardiogram at appointment in the fall.    2.  Palpitations.  She currently denies palpitations.  Will continue to observe.  She will continue to avoid stimulants such as caffeine, alcohol, chocolate and stimulant medications such as decongestants and nasal sprays.  3.  Elevated glucose.  Followed by Dr. Tejeda.  4.  Hyperlipidemia, on Lipitor and followed by Dr. Tejeda.  5.  Macrocytosis followed by Dr. Tejeda.  6.  Borderline low blood pressure.  Reminded her to monitor for hypotension with coming summer months.  Blood pressure today is reasonable.  Reminded her to stay hydrated especially in the summer heat.  7.  Preoperative clearance.  She had normal stress test in the past.  Echo with only mild mitral regurgitation.  No new symptoms.  Would consider her average risk for this elective procedure.  She is cleared from a cardiac standpoint to proceed with scheduled surgery on June 17 with Dr. Bobby.      Time Spent: I spent 30 minutes caring for Ed on this date of service. This time  includes time spent by me in the following activities: preparing for the visit, reviewing tests, performing a medically appropriate examination and/or evaluations, counseling and educating the patient/family/caregiver, ordering medications, tests, or procedures, documenting information in the medical record, and independently interpreting results and communicating that information with the patient/family/caregiver.   I spent 1 minutes on the separately reported service of ECG. This time is not included in the time used to support the E/M service also reported today.        Your medication list            Accurate as of May 14, 2024  8:41 AM. If you have any questions, ask your nurse or doctor.                CONTINUE taking these medications        Instructions Last Dose Given Next Dose Due   atorvastatin 10 MG tablet  Commonly known as: LIPITOR      Take 1 tablet by mouth Daily.       DULoxetine 60 MG capsule  Commonly known as: CYMBALTA      Take 1 capsule by mouth Daily. With a 30mg tablet       DULoxetine 30 MG capsule  Commonly known as: CYMBALTA      Take 1 capsule by mouth Daily. With a 60mg tablet       ergocalciferol 1.25 MG (54098 UT) capsule  Commonly known as: ERGOCALCIFEROL           MAG-200 PO      Take 300 mg by mouth Daily.       multivitamin with minerals tablet tablet      Take  by mouth.       PROBIOTIC DAILY PO      Take  by mouth.       temazepam 30 MG capsule  Commonly known as: RESTORIL      Take 1 capsule by mouth At Night As Needed for Sleep.       traZODone 50 MG tablet  Commonly known as: DESYREL      Take 1 tablet by mouth Daily.              STOP taking these medications      terbinafine 250 MG tablet  Commonly known as: lamiSIL  Stopped by: WAGNER Lee                 Patient is no longer taking -.  I corrected the med list to reflect this.  I did not stop these medications.    Return for Keep fall appointment with Dr. Tinoco.      Dictated utilizing Dragon dictation

## 2024-10-30 ENCOUNTER — OFFICE VISIT (OUTPATIENT)
Dept: CARDIOLOGY | Facility: CLINIC | Age: 69
End: 2024-10-30
Payer: MEDICARE

## 2024-10-30 ENCOUNTER — TELEPHONE (OUTPATIENT)
Dept: CARDIOLOGY | Facility: CLINIC | Age: 69
End: 2024-10-30
Payer: MEDICARE

## 2024-10-30 VITALS
RESPIRATION RATE: 16 BRPM | HEIGHT: 68 IN | OXYGEN SATURATION: 98 % | DIASTOLIC BLOOD PRESSURE: 74 MMHG | WEIGHT: 123.8 LBS | SYSTOLIC BLOOD PRESSURE: 112 MMHG | HEART RATE: 84 BPM | BODY MASS INDEX: 18.76 KG/M2

## 2024-10-30 DIAGNOSIS — R00.2 PALPITATIONS: ICD-10-CM

## 2024-10-30 DIAGNOSIS — I47.10 SVT (SUPRAVENTRICULAR TACHYCARDIA): ICD-10-CM

## 2024-10-30 DIAGNOSIS — I51.7 RIGHT ATRIAL ENLARGEMENT: ICD-10-CM

## 2024-10-30 DIAGNOSIS — I34.0 MITRAL VALVE INSUFFICIENCY, UNSPECIFIED ETIOLOGY: ICD-10-CM

## 2024-10-30 DIAGNOSIS — I47.10 SVT (SUPRAVENTRICULAR TACHYCARDIA): Primary | ICD-10-CM

## 2024-10-30 DIAGNOSIS — I34.1 MITRAL VALVE PROLAPSE: ICD-10-CM

## 2024-10-30 DIAGNOSIS — I34.1 MVP (MITRAL VALVE PROLAPSE): Primary | ICD-10-CM

## 2024-10-30 PROCEDURE — 99214 OFFICE O/P EST MOD 30 MIN: CPT | Performed by: INTERNAL MEDICINE

## 2024-10-30 PROCEDURE — 1159F MED LIST DOCD IN RCRD: CPT | Performed by: INTERNAL MEDICINE

## 2024-10-30 PROCEDURE — 1160F RVW MEDS BY RX/DR IN RCRD: CPT | Performed by: INTERNAL MEDICINE

## 2024-10-30 PROCEDURE — 93000 ELECTROCARDIOGRAM COMPLETE: CPT | Performed by: INTERNAL MEDICINE

## 2024-10-30 RX ORDER — MELOXICAM 15 MG/1
15 TABLET ORAL DAILY
COMMUNITY
Start: 2024-07-29

## 2024-10-30 RX ORDER — ASPIRIN 81 MG/1
81 TABLET ORAL DAILY
COMMUNITY

## 2024-10-30 RX ORDER — ONDANSETRON 4 MG/1
4 TABLET, FILM COATED ORAL EVERY 8 HOURS PRN
COMMUNITY

## 2024-10-30 RX ORDER — CELECOXIB 200 MG/1
1 CAPSULE ORAL DAILY
COMMUNITY

## 2024-10-30 NOTE — PROGRESS NOTES
Date of Office Visit: 10/30/2024  Encounter Provider: Petra Tinoco MD  Place of Service: [facility] CARDIOLOGY  Patient Name: Ed Betancur  :1955    Chief complaint  Mitral valve prolapse with mitral valve regurgitation and supraventricular tachycardia    History of Present Illness  The patient is a 69 year old female with history of intermittent chest pain that's been predominantly atypical and nonspecific ST-T wave changes were seen in 2016 after an episode of near syncope in the setting of GI distress and a history of 30 pound weight loss.  She had a 17 day monitor that revealed sinus rhythm with no arrhythmia despite symptoms of dizziness.  She had a stress echocardiogram that revealed no ischemia at an excellent workload with 2 episodes of brief supraventricular tachycardia noted in early recovery resting images showed normal systolic function with negative saline injection.  There was mitral valve prolapse with mild to moderate mitral regurgitation noted.  There was mild to moderate tricuspid regurgitation with normal right-sided pressures. Vasoprotective maneuvers were discussed as well as liberalization of salt.  In 2020 complaints of chest pain, shortness of breath and palpitations.  A stress echocardiogram showed normal left ventricular size and systolic function and no mention of mitral prolapse was made.  There is trivial to mild mitral regurgitation.  Pulmonary pressures were normal.  There was no ischemia at an excellent workload (12 METS).  Blood pressure response was normal.  Echocardiogram in 10/2023 showed an ejection fraction 62%.  With normal diastolic function, moderate bileaflet prolapse of the mitral valve with mild regurgitation multiple jets.  There is mild tricuspid regurgitation with RVSP 40 mmHg.    Since last visit she had knee replacement has and has almost completed physical therapy.  She is slowly increasing her activity.  She states  she has had palpitations that occur once a week lasting for few seconds.  They are more frequent than they were a year ago.  Of note in the past with surface monitoring she developed a rash with the event monitor.  Patient's had no chest pain, shortness of breath, palpitations, syncope near syncope. She had knee replacement     Past Medical History:   Diagnosis Date    Acute bronchitis     Chest pain     Epigastric pain     Fatigue     Headache     Health care maintenance     Insomnia     Muscle spasm     MVP (mitral valve prolapse)     Nonrheumatic mitral valve regurgitation     Pain, upper back     Postmenopausal     Spasm of esophagus     SVT (supraventricular tachycardia)      Past Surgical History:   Procedure Laterality Date    BREAST SURGERY       SECTION      HX OVARIAN CYSTECTOMY      HYSTEROSCOPY ENDOMETRIAL ABLATION      OVARIAN CYST REMOVAL      TONSILLECTOMY       Outpatient Medications Prior to Visit   Medication Sig Dispense Refill    atorvastatin (LIPITOR) 10 MG tablet Take 1 tablet by mouth Daily.      celecoxib (CeleBREX) 200 MG capsule Take 1 capsule by mouth Daily.      DULoxetine (CYMBALTA) 60 MG capsule Take 1 capsule by mouth Daily. With a 30mg tablet      ergocalciferol (ERGOCALCIFEROL) 1.25 MG (48498 UT) capsule       Magnesium Oxide (MAG-200 PO) Take 300 mg by mouth Daily.      Multiple Vitamins-Minerals (MULTIVITAMIN ADULT PO) Take  by mouth.      Probiotic Product (PROBIOTIC DAILY PO) Take  by mouth.      temazepam (RESTORIL) 30 MG capsule Take 1 capsule by mouth At Night As Needed for Sleep.      traZODone (DESYREL) 50 MG tablet Take 1 tablet by mouth Daily. 30 tablet 11    aspirin 81 MG EC tablet Take 1 tablet by mouth Daily. (Patient not taking: Reported on 10/30/2024)      meloxicam (MOBIC) 15 MG tablet 1 tablet Daily. (Patient not taking: Reported on 10/30/2024)      ondansetron (ZOFRAN) 4 MG tablet Take 1 tablet by mouth Every 8 (Eight) Hours As Needed. (Patient not taking:  "Reported on 10/30/2024)       No facility-administered medications prior to visit.       Allergies as of 10/30/2024 - Reviewed 10/30/2024   Allergen Reaction Noted    Sulfa antibiotics Hives 06/25/2012    Adhesive tape Rash 10/06/2021    Latex Rash 11/10/2016     Social History     Socioeconomic History    Marital status:    Tobacco Use    Smoking status: Former     Types: Cigarettes     Passive exposure: Past    Smokeless tobacco: Former    Tobacco comments:     smoked socially during childhood   Vaping Use    Vaping status: Never Used   Substance and Sexual Activity    Alcohol use: Yes     Comment: moderate alcohol use    Drug use: Never    Sexual activity: Defer     Family History   Problem Relation Age of Onset    Alzheimer's disease Mother     Breast cancer Mother     Heart disease Father     Colon cancer Maternal Grandmother     Breast cancer Paternal Grandmother     Alzheimer's disease Paternal Grandfather      Review of Systems   Constitutional: Negative for chills, fever, weight gain and weight loss.   Cardiovascular:  Negative for leg swelling.   Respiratory:  Negative for cough, snoring and wheezing.    Hematologic/Lymphatic: Negative for bleeding problem. Does not bruise/bleed easily.   Skin:  Negative for color change.   Musculoskeletal:  Negative for falls, joint pain and myalgias.   Gastrointestinal:  Negative for melena.   Genitourinary:  Negative for hematuria.   Neurological:  Negative for excessive daytime sleepiness.   Psychiatric/Behavioral:  Negative for depression. The patient is nervous/anxious.         Objective:     Vitals:    10/30/24 0943   BP: 112/74   Pulse: 84   Resp: 16   SpO2: 98%   Weight: 56.2 kg (123 lb 12.8 oz)   Height: 172.7 cm (68\")     Body mass index is 18.82 kg/m².    Vitals reviewed.   Constitutional:       Appearance: Well-developed.   Eyes:      General: No scleral icterus.        Right eye: No discharge.      Conjunctiva/sclera: Conjunctivae normal.      Pupils: " Pupils are equal, round, and reactive to light.   HENT:      Head: Normocephalic.      Nose: Nose normal.   Neck:      Thyroid: No thyromegaly.      Vascular: No JVD.   Pulmonary:      Effort: Pulmonary effort is normal. No respiratory distress.      Breath sounds: Normal breath sounds. No wheezing. No rales.   Cardiovascular:      Normal rate. Regular rhythm. Normal S1. Normal S2.       Murmurs: There is a grade 2/6 crescendo-decrescendo, mid to late systolic murmur at the apex.      No gallop.    Pulses:     Intact distal pulses.      Carotid: 2+ bilaterally.     Radial: 2+ bilaterally.     Femoral: 2+ bilaterally.     Popliteal: 2+ bilaterally.     Dorsalis pedis: 2+ bilaterally.     Posterior tibial: 2+ bilaterally.  Edema:     Peripheral edema absent.   Abdominal:      General: Bowel sounds are normal. There is no distension.      Palpations: Abdomen is soft.      Tenderness: There is no abdominal tenderness. There is no rebound.   Musculoskeletal: Normal range of motion.         General: No tenderness.      Cervical back: Normal range of motion and neck supple. Skin:     General: Skin is warm and dry.      Findings: No erythema or rash.   Neurological:      Mental Status: Alert and oriented to person, place, and time.   Psychiatric:         Behavior: Behavior normal.         Thought Content: Thought content normal.         Judgment: Judgment normal.       Lab Review:   Lab Results - Last 18 Months   Lab Units 09/13/24  0835 06/05/24  1104   WBC 10*3/uL 4.60 4.18*   RBC 10*6/uL 4.42 4.41   HEMOGLOBIN g/dL 14.4 14.3   HEMATOCRIT % 43.4 44.1   MCV fL 98.2 100.0   MCH pg 32.6 32.4   MCHC g/dL 33.2 32.4   RDW % 12.5 12.6   PLATELETS 10*3/uL 209 210   NEUTROPHIL % % 60.3 53.4   LYMPHOCYTE % % 25.0 32.3   MONOCYTES % % 9.3 9.6   EOSINOPHIL % % 4.1 3.1   BASOPHIL % % 1.1 1.4   NEUTROS ABS 10*3/uL 2.77 2.23   LYMPHS ABS 10*3/uL 1.15 1.35   MONOS ABS 10*3/uL 0.43 0.40   EOS ABS 10*3/uL 0.19 0.13   BASOS ABS 10*3/uL 0.05  0.06   MPV fL 9.9 11.3     Blood work dated 5/2024 performed elsewhere includes normal BMP, LDL 48, HDL 89, triglycerides 35 glucose again elevated at 110.  By 6/2024 glucose improved to 99      ECG 12 Lead    Date/Time: 10/30/2024 10:05 AM  Performed by: Petra Tinoco MD    Authorized by: Petra Tinoco MD  Comparison: compared with previous ECG   Similar to previous ECG  Comparison to previous ECG: Borderline change in right atrial amplitude suggestive of right atrial enlargement  Rhythm: sinus rhythm  Other findings: non-specific ST-T wave changes and right atrial abnormality    Clinical impression: abnormal EKG        Assessment:       Diagnosis Plan   1. MVP (mitral valve prolapse)  ECG 12 Lead      2. Mitral valve insufficiency, unspecified etiology  ECG 12 Lead      3. Right atrial enlargement  ECG 12 Lead    Adult Transthoracic Echo Complete W/ Cont if Necessary Per Protocol      4. Mitral valve prolapse        5. SVT (supraventricular tachycardia)          Plan:       1.  Mitral valve prolapse with mitral valve regurgitation.  Echo 8/2020 with trivial to mild mitral valve regurgitation.  Clinicall sounds more moderate when laying on the left side.  Will check an echocardiogram  2.  Chronically abnormal EKG with inferior lateral ST-T wave changes.  Stress echo negative for ischemia 8/2020.  No anginal symptoms  3.  Elevated pulmonary pressures will check an echocardiogram  4.  Palpitation.  Will check with monitor tech to see if there is there any new hypoallergenic monitoring tools for 2-week monitoring as the symptoms seem to have worsened  5.  Questionable right atrial margin noted on EKG.  Await echo  6.  Hyperlipidemia, controlled on Lipitor and followed by Dr. Tejeda.  7.  Prebiabetes        Time Spent: I spent 35 minutes caring for Ed on this date of service. This time includes time spent by me in the following activities: preparing for the visit, reviewing tests, obtaining and/or reviewing a  separately obtained history, performing a medically appropriate examination and/or evaluation, counseling and educating the patient/family/caregiver, ordering medications, tests, or procedures, documenting information in the medical record, and independently interpreting results and communicating that information with the patient/family/caregiverI spent 1 minutes on the separately reported service of ECG. This time is not included in the time used to support the E/M service also reported today.        Your medication list            Accurate as of October 30, 2024 11:59 PM. If you have any questions, ask your nurse or doctor.                CONTINUE taking these medications        Instructions Last Dose Given Next Dose Due   aspirin 81 MG EC tablet      Take 1 tablet by mouth Daily.       atorvastatin 10 MG tablet  Commonly known as: LIPITOR      Take 1 tablet by mouth Daily.       celecoxib 200 MG capsule  Commonly known as: CeleBREX      Take 1 capsule by mouth Daily.       DULoxetine 60 MG capsule  Commonly known as: CYMBALTA      Take 1 capsule by mouth Daily. With a 30mg tablet       ergocalciferol 1.25 MG (39859 UT) capsule  Commonly known as: ERGOCALCIFEROL           MAG-200 PO      Take 300 mg by mouth Daily.       meloxicam 15 MG tablet  Commonly known as: MOBIC      1 tablet Daily.       multivitamin with minerals tablet tablet      Take  by mouth.       ondansetron 4 MG tablet  Commonly known as: ZOFRAN      Take 1 tablet by mouth Every 8 (Eight) Hours As Needed.       PROBIOTIC DAILY PO      Take  by mouth.       temazepam 30 MG capsule  Commonly known as: RESTORIL      Take 1 capsule by mouth At Night As Needed for Sleep.       traZODone 50 MG tablet  Commonly known as: DESYREL      Take 1 tablet by mouth Daily.                Patient is no longer taking -.  I corrected the med list to reflect this.  I did not stop these medications.      Dictated utilizing Dragon dictation

## 2024-10-30 NOTE — TELEPHONE ENCOUNTER
Patient had an event monitor placed previously many years ago and had a rash.  Can you please check with pacemaker to see if we have hypoallergenic options for [place]/2 weeks of continuous monitoring

## 2024-11-05 NOTE — TELEPHONE ENCOUNTER
Please let patient know this and I think she should go ahead and get the 2-week [place] monitor

## 2024-11-06 NOTE — TELEPHONE ENCOUNTER
Spoke with pt.  She is agreeable to try the [place] 14 day monitor.      Prema: Can you please schedule.

## 2024-11-14 DIAGNOSIS — R00.2 PALPITATIONS: ICD-10-CM

## 2024-11-14 DIAGNOSIS — I47.10 SVT (SUPRAVENTRICULAR TACHYCARDIA): Primary | ICD-10-CM

## 2024-11-15 ENCOUNTER — HOSPITAL ENCOUNTER (OUTPATIENT)
Dept: CARDIOLOGY | Facility: HOSPITAL | Age: 69
Discharge: HOME | End: 2024-11-15
Payer: MEDICARE

## 2024-11-15 VITALS
OXYGEN SATURATION: 94 % | SYSTOLIC BLOOD PRESSURE: 120 MMHG | WEIGHT: 123 LBS | HEIGHT: 68 IN | HEART RATE: 86 BPM | DIASTOLIC BLOOD PRESSURE: 70 MMHG | BODY MASS INDEX: 18.64 KG/M2

## 2024-11-15 DIAGNOSIS — I51.7 RIGHT ATRIAL ENLARGEMENT: ICD-10-CM

## 2024-11-15 LAB
AORTIC ARCH: 2.6 CM
AORTIC DIMENSIONLESS INDEX: 0.8 (DI)
ASCENDING AORTA: 2.8 CM
BH CV ECHO MEAS - ACS: 2 CM
BH CV ECHO MEAS - AO MAX PG: 7.1 MMHG
BH CV ECHO MEAS - AO MEAN PG: 4 MMHG
BH CV ECHO MEAS - AO ROOT AREA (BSA CORRECTED): 1.7 CM2
BH CV ECHO MEAS - AO ROOT DIAM: 2.8 CM
BH CV ECHO MEAS - AO V2 MAX: 133 CM/SEC
BH CV ECHO MEAS - AO V2 VTI: 24.6 CM
BH CV ECHO MEAS - AVA(I,D): 2.07 CM2
BH CV ECHO MEAS - EDV(CUBED): 79.5 ML
BH CV ECHO MEAS - EDV(MOD-SP2): 39 ML
BH CV ECHO MEAS - EDV(MOD-SP4): 57 ML
BH CV ECHO MEAS - EF(MOD-BP): 62.8 %
BH CV ECHO MEAS - EF(MOD-SP2): 59 %
BH CV ECHO MEAS - EF(MOD-SP4): 64.9 %
BH CV ECHO MEAS - ESV(CUBED): 15.9 ML
BH CV ECHO MEAS - ESV(MOD-SP2): 16 ML
BH CV ECHO MEAS - ESV(MOD-SP4): 20 ML
BH CV ECHO MEAS - FS: 41.5 %
BH CV ECHO MEAS - IVS/LVPW: 0.89 CM
BH CV ECHO MEAS - IVSD: 0.8 CM
BH CV ECHO MEAS - LAT PEAK E' VEL: 6.4 CM/SEC
BH CV ECHO MEAS - LV DIASTOLIC VOL/BSA (35-75): 34.3 CM2
BH CV ECHO MEAS - LV MASS(C)D: 114.2 GRAMS
BH CV ECHO MEAS - LV MAX PG: 4.8 MMHG
BH CV ECHO MEAS - LV MEAN PG: 2 MMHG
BH CV ECHO MEAS - LV SYSTOLIC VOL/BSA (12-30): 12 CM2
BH CV ECHO MEAS - LV V1 MAX: 109 CM/SEC
BH CV ECHO MEAS - LV V1 VTI: 19.7 CM
BH CV ECHO MEAS - LVIDD: 4.3 CM
BH CV ECHO MEAS - LVIDS: 2.5 CM
BH CV ECHO MEAS - LVOT AREA: 2.6 CM2
BH CV ECHO MEAS - LVOT DIAM: 1.81 CM
BH CV ECHO MEAS - LVPWD: 0.9 CM
BH CV ECHO MEAS - MED PEAK E' VEL: 8.2 CM/SEC
BH CV ECHO MEAS - MV A DUR: 0.11 SEC
BH CV ECHO MEAS - MV A MAX VEL: 83.9 CM/SEC
BH CV ECHO MEAS - MV DEC SLOPE: 456.9 CM/SEC2
BH CV ECHO MEAS - MV DEC TIME: 0.13 SEC
BH CV ECHO MEAS - MV E MAX VEL: 58.2 CM/SEC
BH CV ECHO MEAS - MV E/A: 0.69
BH CV ECHO MEAS - MV MAX PG: 3.3 MMHG
BH CV ECHO MEAS - MV MEAN PG: 1.92 MMHG
BH CV ECHO MEAS - MV P1/2T: 50.5 MSEC
BH CV ECHO MEAS - MV V2 VTI: 21 CM
BH CV ECHO MEAS - MVA(P1/2T): 4.4 CM2
BH CV ECHO MEAS - MVA(VTI): 2.42 CM2
BH CV ECHO MEAS - PA ACC TIME: 0.09 SEC
BH CV ECHO MEAS - PA V2 MAX: 70.6 CM/SEC
BH CV ECHO MEAS - PULM A REVS DUR: 0.1 SEC
BH CV ECHO MEAS - PULM A REVS VEL: 40.3 CM/SEC
BH CV ECHO MEAS - PULM DIAS VEL: 46.3 CM/SEC
BH CV ECHO MEAS - PULM S/D: 1.43
BH CV ECHO MEAS - PULM SYS VEL: 66 CM/SEC
BH CV ECHO MEAS - QP/QS: 0.77
BH CV ECHO MEAS - RAP SYSTOLE: 3 MMHG
BH CV ECHO MEAS - RV MAX PG: 1.74 MMHG
BH CV ECHO MEAS - RV V1 MAX: 66 CM/SEC
BH CV ECHO MEAS - RV V1 VTI: 14.5 CM
BH CV ECHO MEAS - RVOT DIAM: 1.85 CM
BH CV ECHO MEAS - RVSP: 27.4 MMHG
BH CV ECHO MEAS - SUP REN AO DIAM: 1.9 CM
BH CV ECHO MEAS - SV(LVOT): 50.9 ML
BH CV ECHO MEAS - SV(MOD-SP2): 23 ML
BH CV ECHO MEAS - SV(MOD-SP4): 37 ML
BH CV ECHO MEAS - SV(RVOT): 39.1 ML
BH CV ECHO MEAS - SVI(LVOT): 30.6 ML/M2
BH CV ECHO MEAS - SVI(MOD-SP2): 13.8 ML/M2
BH CV ECHO MEAS - SVI(MOD-SP4): 22.3 ML/M2
BH CV ECHO MEAS - TAPSE (>1.6): 2 CM
BH CV ECHO MEAS - TR MAX PG: 24.4 MMHG
BH CV ECHO MEAS - TR MAX VEL: 247 CM/SEC
BH CV ECHO MEAS RV FREE WALL STRAIN: -25 %
BH CV ECHO MEASUREMENTS AVERAGE E/E' RATIO: 7.97
BH CV XLRA - RV BASE: 2.8 CM
BH CV XLRA - RV LENGTH: 6.2 CM
BH CV XLRA - RV MID: 2.37 CM
BH CV XLRA - TDI S': 14.1 CM/SEC
LEFT ATRIUM VOLUME INDEX: 22.5 ML/M2
SINUS: 2.9 CM
STJ: 2.7 CM

## 2024-11-15 PROCEDURE — 93306 TTE W/DOPPLER COMPLETE: CPT

## 2024-11-18 ENCOUNTER — TELEPHONE (OUTPATIENT)
Dept: CARDIOLOGY | Facility: CLINIC | Age: 69
End: 2024-11-18
Payer: MEDICARE

## 2024-11-18 ENCOUNTER — TELEPHONE (OUTPATIENT)
Dept: CARDIOLOGY | Facility: CLINIC | Age: 69
End: 2024-11-18

## 2024-11-18 NOTE — TELEPHONE ENCOUNTER
Caller: Ed Betancur     Relationship: PATIENT    Best call back number: [phone]    What is your medical concern? PT IS CALLING TO SEE IF WE CAN GET HER MONITOR REGISTERED, SHE SAID SHE ECEIVED A CALL SAYING THAT IT WASN'T REGISTERED AND WE WOULDN'T GET ANY OF THE INFO. PT ASKED FOR A TEXT MESSAGE OR CALL BACK, PLEASE ADVISE

## 2024-11-18 NOTE — TELEPHONE ENCOUNTER
Reviewed results and recommendations with Ed Betancur.  Patient verbalized understanding of results and recommendations.    Thank you,  Hope JONES RN  Triage Nurse [facility]  11/18/24    11:09 EST

## 2024-11-18 NOTE — TELEPHONE ENCOUNTER
Please let her know that echo looks good.  Her heart is strong and functioning well.  Prior enlargement of the right atrium has resolved.  There is moderate bileaflet mitral prolapse and a very small amount of leakage from the mitral valve that we will continue to follow.  There is no narrowing of the valve.  Would not make any medication changes based on this.  Will await monitor study.

## 2024-12-06 ENCOUNTER — TELEPHONE (OUTPATIENT)
Dept: CARDIOLOGY | Facility: CLINIC | Age: 69
End: 2024-12-06
Payer: MEDICARE

## 2024-12-06 NOTE — TELEPHONE ENCOUNTER
Results and recommendations called to pt.  Instructed to call with any further questions or concerns.  Verbalized understanding.    Prema Sarah RN  Triage Nurse, [facility] [place]  12/06/24 15:10 EST

## 2024-12-06 NOTE — TELEPHONE ENCOUNTER
Please let her know that monitor study looked good overall.  There were 21 patient triggered events but only 2 of them were associated with any abnormal heart rhythm.  There were episodes of supraventricular tachycardia that were brief and the longest only lasted 9 beats.  Would make sure she is avoiding stimulants such as caffeine, alcohol, chocolate, or stimulant medication such as decongestants and nasal sprays.  Would also make sure she is staying well-hydrated.  If these are bothersome to her could consider low-dose metoprolol to assist in controlling these if blood pressure will allow.